# Patient Record
Sex: FEMALE | Race: WHITE | Employment: PART TIME | URBAN - NONMETROPOLITAN AREA
[De-identification: names, ages, dates, MRNs, and addresses within clinical notes are randomized per-mention and may not be internally consistent; named-entity substitution may affect disease eponyms.]

---

## 2022-03-22 ENCOUNTER — HOSPITAL ENCOUNTER (OUTPATIENT)
Age: 47
Setting detail: SPECIMEN
Discharge: HOME | End: 2022-03-22
Payer: COMMERCIAL

## 2022-03-22 PROCEDURE — 87338 HPYLORI STOOL AG IA: CPT

## 2022-03-23 LAB
DIRECT EXAM: NEGATIVE
SPECIMEN DESCRIPTION: NORMAL

## 2022-08-04 ENCOUNTER — APPOINTMENT (OUTPATIENT)
Dept: CT IMAGING | Age: 47
End: 2022-08-04
Payer: COMMERCIAL

## 2022-08-04 ENCOUNTER — HOSPITAL ENCOUNTER (OUTPATIENT)
Age: 47
Setting detail: OBSERVATION
Discharge: HOME | End: 2022-08-06
Attending: EMERGENCY MEDICINE | Admitting: STUDENT IN AN ORGANIZED HEALTH CARE EDUCATION/TRAINING PROGRAM
Payer: COMMERCIAL

## 2022-08-04 DIAGNOSIS — R52 INTRACTABLE PAIN: ICD-10-CM

## 2022-08-04 DIAGNOSIS — N20.0 KIDNEY STONE ON LEFT SIDE: Primary | ICD-10-CM

## 2022-08-04 DIAGNOSIS — N17.9 AKI (ACUTE KIDNEY INJURY) (HCC): ICD-10-CM

## 2022-08-04 LAB
ABSOLUTE EOS #: 0.17 K/UL (ref 0–0.44)
ABSOLUTE IMMATURE GRANULOCYTE: 0 K/UL (ref 0–0.3)
ABSOLUTE LYMPH #: 6.12 K/UL (ref 1.1–3.7)
ABSOLUTE MONO #: 1.19 K/UL (ref 0.1–1.2)
ANION GAP SERPL CALCULATED.3IONS-SCNC: 14 MMOL/L (ref 9–17)
BASOPHILS # BLD: 0 % (ref 0–2)
BASOPHILS ABSOLUTE: 0 K/UL (ref 0–0.2)
BUN BLDV-MCNC: 17 MG/DL (ref 6–20)
BUN/CREAT BLD: 18 (ref 9–20)
CALCIUM SERPL-MCNC: 9.5 MG/DL (ref 8.6–10.4)
CHLORIDE BLD-SCNC: 105 MMOL/L (ref 98–107)
CO2: 22 MMOL/L (ref 20–31)
CREAT SERPL-MCNC: 0.94 MG/DL (ref 0.5–0.9)
EOSINOPHILS RELATIVE PERCENT: 1 % (ref 1–4)
GFR AFRICAN AMERICAN: >60 ML/MIN
GFR NON-AFRICAN AMERICAN: >60 ML/MIN
GFR SERPL CREATININE-BSD FRML MDRD: ABNORMAL ML/MIN/{1.73_M2}
GFR SERPL CREATININE-BSD FRML MDRD: ABNORMAL ML/MIN/{1.73_M2}
GLUCOSE BLD-MCNC: 99 MG/DL (ref 70–99)
HCT VFR BLD CALC: 41.3 % (ref 36.3–47.1)
HEMOGLOBIN: 13.8 G/DL (ref 11.9–15.1)
IMMATURE GRANULOCYTES: 0 %
LYMPHOCYTES # BLD: 36 % (ref 24–43)
MCH RBC QN AUTO: 31.7 PG (ref 25.2–33.5)
MCHC RBC AUTO-ENTMCNC: 33.4 G/DL (ref 28.4–34.8)
MCV RBC AUTO: 94.9 FL (ref 82.6–102.9)
MONOCYTES # BLD: 7 % (ref 3–12)
MORPHOLOGY: NORMAL
NRBC AUTOMATED: 0 PER 100 WBC
PDW BLD-RTO: 12.7 % (ref 11.8–14.4)
PLATELET # BLD: 387 K/UL (ref 138–453)
PMV BLD AUTO: 9.8 FL (ref 8.1–13.5)
POTASSIUM SERPL-SCNC: 4 MMOL/L (ref 3.7–5.3)
RBC # BLD: 4.35 M/UL (ref 3.95–5.11)
SEG NEUTROPHILS: 56 % (ref 36–65)
SEGMENTED NEUTROPHILS ABSOLUTE COUNT: 9.52 K/UL (ref 1.5–8.1)
SODIUM BLD-SCNC: 141 MMOL/L (ref 135–144)
WBC # BLD: 17 K/UL (ref 3.5–11.3)

## 2022-08-04 PROCEDURE — 36415 COLL VENOUS BLD VENIPUNCTURE: CPT

## 2022-08-04 PROCEDURE — 2580000003 HC RX 258: Performed by: EMERGENCY MEDICINE

## 2022-08-04 PROCEDURE — 85025 COMPLETE CBC W/AUTO DIFF WBC: CPT

## 2022-08-04 PROCEDURE — 80048 BASIC METABOLIC PNL TOTAL CA: CPT

## 2022-08-04 PROCEDURE — 96367 TX/PROPH/DG ADDL SEQ IV INF: CPT

## 2022-08-04 PROCEDURE — 96365 THER/PROPH/DIAG IV INF INIT: CPT

## 2022-08-04 PROCEDURE — 81001 URINALYSIS AUTO W/SCOPE: CPT

## 2022-08-04 PROCEDURE — 6360000002 HC RX W HCPCS: Performed by: EMERGENCY MEDICINE

## 2022-08-04 PROCEDURE — 74176 CT ABD & PELVIS W/O CONTRAST: CPT

## 2022-08-04 PROCEDURE — 99285 EMERGENCY DEPT VISIT HI MDM: CPT

## 2022-08-04 PROCEDURE — 96376 TX/PRO/DX INJ SAME DRUG ADON: CPT

## 2022-08-04 PROCEDURE — 96361 HYDRATE IV INFUSION ADD-ON: CPT

## 2022-08-04 PROCEDURE — 96375 TX/PRO/DX INJ NEW DRUG ADDON: CPT

## 2022-08-04 RX ORDER — ATORVASTATIN CALCIUM 40 MG/1
40 TABLET, FILM COATED ORAL DAILY
COMMUNITY

## 2022-08-04 RX ORDER — LISINOPRIL 10 MG/1
10 TABLET ORAL DAILY
COMMUNITY

## 2022-08-04 RX ORDER — KETOROLAC TROMETHAMINE 30 MG/ML
30 INJECTION, SOLUTION INTRAMUSCULAR; INTRAVENOUS ONCE
Status: COMPLETED | OUTPATIENT
Start: 2022-08-04 | End: 2022-08-04

## 2022-08-04 RX ORDER — FENTANYL CITRATE 50 UG/ML
25 INJECTION, SOLUTION INTRAMUSCULAR; INTRAVENOUS ONCE
Status: COMPLETED | OUTPATIENT
Start: 2022-08-04 | End: 2022-08-04

## 2022-08-04 RX ORDER — ONDANSETRON 2 MG/ML
4 INJECTION INTRAMUSCULAR; INTRAVENOUS ONCE
Status: COMPLETED | OUTPATIENT
Start: 2022-08-04 | End: 2022-08-04

## 2022-08-04 RX ORDER — 0.9 % SODIUM CHLORIDE 0.9 %
1000 INTRAVENOUS SOLUTION INTRAVENOUS ONCE
Status: COMPLETED | OUTPATIENT
Start: 2022-08-04 | End: 2022-08-04

## 2022-08-04 RX ADMIN — HYDROMORPHONE HYDROCHLORIDE 1 MG: 1 INJECTION, SOLUTION INTRAMUSCULAR; INTRAVENOUS; SUBCUTANEOUS at 20:57

## 2022-08-04 RX ADMIN — SODIUM CHLORIDE 1000 ML: 9 INJECTION, SOLUTION INTRAVENOUS at 19:42

## 2022-08-04 RX ADMIN — CEFTRIAXONE 1000 MG: 1 INJECTION, POWDER, FOR SOLUTION INTRAMUSCULAR; INTRAVENOUS at 20:05

## 2022-08-04 RX ADMIN — KETOROLAC TROMETHAMINE 30 MG: 30 INJECTION, SOLUTION INTRAMUSCULAR at 19:22

## 2022-08-04 RX ADMIN — ONDANSETRON 4 MG: 2 INJECTION INTRAMUSCULAR; INTRAVENOUS at 19:21

## 2022-08-04 RX ADMIN — FENTANYL CITRATE 25 MCG: 50 INJECTION, SOLUTION INTRAMUSCULAR; INTRAVENOUS at 20:06

## 2022-08-04 RX ADMIN — HYDROMORPHONE HYDROCHLORIDE 1 MG: 1 INJECTION, SOLUTION INTRAMUSCULAR; INTRAVENOUS; SUBCUTANEOUS at 23:18

## 2022-08-04 ASSESSMENT — PAIN SCALES - GENERAL
PAINLEVEL_OUTOF10: 10
PAINLEVEL_OUTOF10: 8
PAINLEVEL_OUTOF10: 10
PAINLEVEL_OUTOF10: 7
PAINLEVEL_OUTOF10: 6
PAINLEVEL_OUTOF10: 7

## 2022-08-04 ASSESSMENT — PAIN DESCRIPTION - ORIENTATION
ORIENTATION: LEFT
ORIENTATION: LEFT
ORIENTATION: RIGHT

## 2022-08-04 ASSESSMENT — PAIN DESCRIPTION - PAIN TYPE: TYPE: ACUTE PAIN

## 2022-08-04 ASSESSMENT — PAIN DESCRIPTION - DESCRIPTORS
DESCRIPTORS: SHARP;STABBING
DESCRIPTORS: SHARP;STABBING

## 2022-08-04 ASSESSMENT — PAIN DESCRIPTION - LOCATION
LOCATION: FLANK
LOCATION: BACK
LOCATION: FLANK
LOCATION: BACK

## 2022-08-04 ASSESSMENT — ENCOUNTER SYMPTOMS
SORE THROAT: 0
BACK PAIN: 0
ABDOMINAL PAIN: 1
ABDOMINAL DISTENTION: 0
SHORTNESS OF BREATH: 0
NAUSEA: 1

## 2022-08-04 ASSESSMENT — PAIN DESCRIPTION - FREQUENCY: FREQUENCY: CONTINUOUS

## 2022-08-04 ASSESSMENT — PAIN - FUNCTIONAL ASSESSMENT: PAIN_FUNCTIONAL_ASSESSMENT: 0-10

## 2022-08-04 NOTE — ED PROVIDER NOTES
[facility] ED  EMERGENCY DEPARTMENT ENCOUNTER      Pt Name: Zelda Timmons  MRN: [id number]  [place] 1975  Date of evaluation: 8/4/2022  Provider: Lesly Ramey, [facility]       Chief Complaint   Patient presents with    Flank Pain     Left started approx 20 min ago, denies injury or difficulty urinating         HISTORY OF PRESENT ILLNESS   (Location/Symptom, Timing/Onset, Context/Setting, Quality, Duration, Modifying Factors, Severity)  Note limiting factors. Zelda Timmons is a 50 y.o. female who presents to the emergency department with complains of sudden onset left flank pain that started approximately 30 minutes prior to arrival.  Patient states that the pain radiates around to the left lower quadrant. She denies any difficulty with urinating or hematuria. She has some nausea but denies any vomiting. She states that she worked outside in the heat for a long time today and has not had much water to drink. She mostly drinks root beer and coffee. She has a history of kidney stones in the past and has been able to pass them on her own. She denies any fever or chills. Currently she rates her pain at a 10 out of 10 and appears to be quite uncomfortable on my exam.    REVIEW OF SYSTEMS    (2-9 systems for level 4, 10 or more for level 5)     Review of Systems   Constitutional:  Negative for fatigue and fever. HENT:  Negative for congestion and sore throat. Eyes:  Negative for visual disturbance. Respiratory:  Negative for shortness of breath. Cardiovascular:  Negative for chest pain and palpitations. Gastrointestinal:  Positive for abdominal pain and nausea. Negative for abdominal distention. Left flank pain   Genitourinary:  Negative for dysuria. Musculoskeletal:  Negative for back pain. Skin:  Negative for rash. Psychiatric/Behavioral:  Negative for suicidal ideas.       Except as noted above the remainder of the review of systems was reviewed and negative. PAST MEDICAL HISTORY     Past Medical History:   Diagnosis Date    Hyperlipidemia     Hypertension          SURGICAL HISTORY       Past Surgical History:   Procedure Laterality Date    BLADDER SURGERY           CURRENT MEDICATIONS       Previous Medications    ATORVASTATIN (LIPITOR) 40 MG TABLET    Take 40 mg by mouth in the morning. LISINOPRIL (PRINIVIL;ZESTRIL) 10 MG TABLET    Take 10 mg by mouth in the morning. ALLERGIES     Sulfa antibiotics    FAMILY HISTORY     History reviewed. No pertinent family history. SOCIAL HISTORY       Social History     Socioeconomic History    Marital status: Single     Spouse name: None    Number of children: None    Years of education: None    Highest education level: None       SCREENINGS                        PHYSICAL EXAM    (up to 7 for level 4, 8 or more for level 5)     ED Triage Vitals [08/04/22 1850]   BP Temp Temp src Heart Rate Resp SpO2 Height Weight   (!) 162/71 97.8 °F (36.6 °C) -- 77 20 95 % -- --       Physical Exam  Constitutional:       General: She is in acute distress. Appearance: Normal appearance. She is not toxic-appearing. HENT:      Head: Normocephalic and atraumatic. Mouth/Throat:      Mouth: Mucous membranes are moist.   Eyes:      Extraocular Movements: Extraocular movements intact. Pupils: Pupils are equal, round, and reactive to light. Cardiovascular:      Rate and Rhythm: Normal rate and regular rhythm. Pulses: Normal pulses. Heart sounds: Normal heart sounds. Pulmonary:      Effort: Pulmonary effort is normal.      Breath sounds: Normal breath sounds. Abdominal:      General: Abdomen is flat. Bowel sounds are normal.      Palpations: Abdomen is soft. Tenderness: abdominal tenderness in the left lower quadrant There is left CVA tenderness. Comments: Left flank pain   Musculoskeletal:         General: Normal range of motion. Skin:     General: Skin is warm and dry. Capillary Refill: Capillary refill takes less than 2 seconds. Neurological:      General: No focal deficit present. Mental Status: She is alert and oriented to person, place, and time. Psychiatric:         Mood and Affect: Mood normal.       DIAGNOSTIC RESULTS     RADIOLOGY:   Non-plain film images such as CT, Ultrasound and MRI are read by the radiologist. Plain radiographic images are visualized and preliminarily interpreted by the emergency physician with the below findings:      Interpretation per the Radiologist below, if available at the time of this note:    CT ABDOMEN PELVIS WO CONTRAST Additional Contrast? None   Final Result   Limited noncontrast examination. 7 mm hyperdense stone in the left proximal ureter resulting in moderate   upstream hydroureteronephrosis. Additional bilateral nonobstructing renal   stones. Increased sclerosis along the left sacroiliac joint, which may reflect   underlying sacroiliitis. LABS:  Labs Reviewed   BASIC METABOLIC PANEL - Abnormal; Notable for the following components:       Result Value    Creatinine 0.94 (*)     All other components within normal limits   CBC WITH AUTO DIFFERENTIAL - Abnormal; Notable for the following components:    WBC 17.0 (*)     Segs Absolute 9.52 (*)     Absolute Lymph # 6.12 (*)     All other components within normal limits   URINALYSIS WITH MICROSCOPIC - Abnormal; Notable for the following components:    Specific Gravity, UA >1.030 (*)     Urine Hgb 3+ (*)     All other components within normal limits       All other labs were within normal range or not returned as of this dictation.     EMERGENCY DEPARTMENT COURSE and DIFFERENTIAL DIAGNOSIS/MDM:   Vitals:    Vitals:    08/04/22 2230 08/04/22 2245 08/04/22 2300 08/04/22 2315   BP: 130/72 132/71 (!) 145/67 (!) 148/67   Pulse:       Resp:       Temp:       SpO2: 94% 92% 98% 94%       REASSESSMENT     ED Course as of 08/05/22 0055   Fri Aug 05, 2022   0046 Spoke with Dr. Tyrell Recinos, neurologist who will consult on the patient. Recommends keeping the patient n.p.o. We will consult hospitalist. [JI]   6441 With Dr. Rupal Suarez, hospitalist who will admit the patient to the hospital. [JI]      ED Course User Index  [JI] Shannan Patel DO     Patient continues to have pain after multiple doses of IV pain medications. Given her elevated white count and GENOVEVA and intractable pain I advised her to stay in the hospital.  Patient is agreeable to that. FINAL IMPRESSION      1. Kidney stone on left side    2. Intractable pain    3.  GENOVEVA (acute kidney injury) [facility])          DISPOSITION/PLAN   DISPOSITION Decision To Admit 08/05/2022 12:48:02 AM      (Please note that portions of this note were completed with a voice recognition program.  Efforts were made to edit the dictations but occasionally words are mis-transcribed.)    Shannan Patel DO (electronically signed)  Attending Emergency Physician            Shannan Patel DO  08/05/22 4498

## 2022-08-04 NOTE — ED NOTES
Pt had moderate emesis, states she feels less nauseated after vomiting     Gail Grossman RN  08/04/22 1974

## 2022-08-05 ENCOUNTER — ANESTHESIA (OUTPATIENT)
Dept: OPERATING ROOM | Age: 47
End: 2022-08-05
Payer: COMMERCIAL

## 2022-08-05 ENCOUNTER — APPOINTMENT (OUTPATIENT)
Dept: GENERAL RADIOLOGY | Age: 47
End: 2022-08-05
Payer: COMMERCIAL

## 2022-08-05 ENCOUNTER — ANESTHESIA EVENT (OUTPATIENT)
Dept: OPERATING ROOM | Age: 47
End: 2022-08-05
Payer: COMMERCIAL

## 2022-08-05 PROBLEM — N13.30 HYDRONEPHROSIS: Status: ACTIVE | Noted: 2022-08-05

## 2022-08-05 PROBLEM — N20.1 URETERAL CALCULUS: Status: ACTIVE | Noted: 2022-08-05

## 2022-08-05 LAB
BILIRUBIN URINE: NEGATIVE
COLOR: YELLOW
EPITHELIAL CELLS UA: ABNORMAL /HPF (ref 0–25)
GLUCOSE URINE: NEGATIVE
HCG(URINE) PREGNANCY TEST: NEGATIVE
KETONES, URINE: NEGATIVE
LEUKOCYTE ESTERASE, URINE: NEGATIVE
NITRITE, URINE: NEGATIVE
PH UA: 6 (ref 5–9)
PROTEIN UA: NEGATIVE
RBC UA: ABNORMAL /HPF (ref 0–2)
SPECIFIC GRAVITY UA: >1.03 (ref 1.01–1.02)
TURBIDITY: CLEAR
URINE HGB: ABNORMAL
UROBILINOGEN, URINE: NORMAL
WBC UA: ABNORMAL /HPF (ref 0–5)

## 2022-08-05 PROCEDURE — 3209999900 FLUORO FOR SURGICAL PROCEDURES

## 2022-08-05 PROCEDURE — 2500000003 HC RX 250 WO HCPCS: Performed by: NURSE ANESTHETIST, CERTIFIED REGISTERED

## 2022-08-05 PROCEDURE — 2580000003 HC RX 258: Performed by: NURSE ANESTHETIST, CERTIFIED REGISTERED

## 2022-08-05 PROCEDURE — G0378 HOSPITAL OBSERVATION PER HR: HCPCS

## 2022-08-05 PROCEDURE — 3600000003 HC SURGERY LEVEL 3 BASE: Performed by: UROLOGY

## 2022-08-05 PROCEDURE — C2617 STENT, NON-COR, TEM W/O DEL: HCPCS | Performed by: UROLOGY

## 2022-08-05 PROCEDURE — 7100000011 HC PHASE II RECOVERY - ADDTL 15 MIN: Performed by: UROLOGY

## 2022-08-05 PROCEDURE — 6370000000 HC RX 637 (ALT 250 FOR IP): Performed by: UROLOGY

## 2022-08-05 PROCEDURE — 81025 URINE PREGNANCY TEST: CPT

## 2022-08-05 PROCEDURE — 2709999900 HC NON-CHARGEABLE SUPPLY: Performed by: UROLOGY

## 2022-08-05 PROCEDURE — 7100000010 HC PHASE II RECOVERY - FIRST 15 MIN: Performed by: UROLOGY

## 2022-08-05 PROCEDURE — 6360000002 HC RX W HCPCS: Performed by: NURSE ANESTHETIST, CERTIFIED REGISTERED

## 2022-08-05 PROCEDURE — 6360000002 HC RX W HCPCS: Performed by: EMERGENCY MEDICINE

## 2022-08-05 PROCEDURE — 3700000001 HC ADD 15 MINUTES (ANESTHESIA): Performed by: UROLOGY

## 2022-08-05 PROCEDURE — C1769 GUIDE WIRE: HCPCS | Performed by: UROLOGY

## 2022-08-05 PROCEDURE — 96376 TX/PRO/DX INJ SAME DRUG ADON: CPT

## 2022-08-05 PROCEDURE — 2580000003 HC RX 258: Performed by: UROLOGY

## 2022-08-05 PROCEDURE — 6360000002 HC RX W HCPCS: Performed by: UROLOGY

## 2022-08-05 PROCEDURE — 3700000000 HC ANESTHESIA ATTENDED CARE: Performed by: UROLOGY

## 2022-08-05 PROCEDURE — 96375 TX/PRO/DX INJ NEW DRUG ADDON: CPT

## 2022-08-05 PROCEDURE — 3600000013 HC SURGERY LEVEL 3 ADDTL 15MIN: Performed by: UROLOGY

## 2022-08-05 DEVICE — URETERAL STENT
Type: IMPLANTABLE DEVICE | Site: URETER | Status: FUNCTIONAL
Brand: PERCUFLEX™ PLUS

## 2022-08-05 RX ORDER — ATORVASTATIN CALCIUM 40 MG/1
40 TABLET, FILM COATED ORAL NIGHTLY
Status: DISCONTINUED | OUTPATIENT
Start: 2022-08-05 | End: 2022-08-06 | Stop reason: HOSPADM

## 2022-08-05 RX ORDER — OXYCODONE HYDROCHLORIDE 5 MG/1
5 TABLET ORAL
Status: DISCONTINUED | OUTPATIENT
Start: 2022-08-05 | End: 2022-08-05 | Stop reason: HOSPADM

## 2022-08-05 RX ORDER — SODIUM CHLORIDE 0.9 % (FLUSH) 0.9 %
5-40 SYRINGE (ML) INJECTION PRN
Status: DISCONTINUED | OUTPATIENT
Start: 2022-08-05 | End: 2022-08-06 | Stop reason: HOSPADM

## 2022-08-05 RX ORDER — METOCLOPRAMIDE HYDROCHLORIDE 5 MG/ML
10 INJECTION INTRAMUSCULAR; INTRAVENOUS
Status: DISCONTINUED | OUTPATIENT
Start: 2022-08-05 | End: 2022-08-05 | Stop reason: HOSPADM

## 2022-08-05 RX ORDER — SODIUM CHLORIDE 0.9 % (FLUSH) 0.9 %
5-40 SYRINGE (ML) INJECTION PRN
Status: DISCONTINUED | OUTPATIENT
Start: 2022-08-05 | End: 2022-08-05 | Stop reason: HOSPADM

## 2022-08-05 RX ORDER — KETOROLAC TROMETHAMINE 30 MG/ML
INJECTION, SOLUTION INTRAMUSCULAR; INTRAVENOUS PRN
Status: DISCONTINUED | OUTPATIENT
Start: 2022-08-05 | End: 2022-08-05 | Stop reason: SDUPTHER

## 2022-08-05 RX ORDER — ONDANSETRON 2 MG/ML
4 INJECTION INTRAMUSCULAR; INTRAVENOUS
Status: DISCONTINUED | OUTPATIENT
Start: 2022-08-05 | End: 2022-08-05 | Stop reason: HOSPADM

## 2022-08-05 RX ORDER — KETOROLAC TROMETHAMINE 30 MG/ML
30 INJECTION, SOLUTION INTRAMUSCULAR; INTRAVENOUS
Status: ACTIVE | OUTPATIENT
Start: 2022-08-05 | End: 2022-08-05

## 2022-08-05 RX ORDER — SODIUM CHLORIDE 0.9 % (FLUSH) 0.9 %
5-40 SYRINGE (ML) INJECTION EVERY 12 HOURS SCHEDULED
Status: DISCONTINUED | OUTPATIENT
Start: 2022-08-05 | End: 2022-08-05 | Stop reason: HOSPADM

## 2022-08-05 RX ORDER — LIDOCAINE HYDROCHLORIDE 20 MG/ML
JELLY TOPICAL PRN
Status: DISCONTINUED | OUTPATIENT
Start: 2022-08-05 | End: 2022-08-05 | Stop reason: ALTCHOICE

## 2022-08-05 RX ORDER — MORPHINE SULFATE 4 MG/ML
4 INJECTION, SOLUTION INTRAMUSCULAR; INTRAVENOUS ONCE
Status: COMPLETED | OUTPATIENT
Start: 2022-08-05 | End: 2022-08-05

## 2022-08-05 RX ORDER — SODIUM CHLORIDE, SODIUM LACTATE, POTASSIUM CHLORIDE, CALCIUM CHLORIDE 600; 310; 30; 20 MG/100ML; MG/100ML; MG/100ML; MG/100ML
INJECTION, SOLUTION INTRAVENOUS CONTINUOUS PRN
Status: DISCONTINUED | OUTPATIENT
Start: 2022-08-05 | End: 2022-08-05 | Stop reason: SDUPTHER

## 2022-08-05 RX ORDER — FENTANYL CITRATE 50 UG/ML
50 INJECTION, SOLUTION INTRAMUSCULAR; INTRAVENOUS EVERY 5 MIN PRN
Status: DISCONTINUED | OUTPATIENT
Start: 2022-08-05 | End: 2022-08-05 | Stop reason: HOSPADM

## 2022-08-05 RX ORDER — ONDANSETRON 2 MG/ML
4 INJECTION INTRAMUSCULAR; INTRAVENOUS EVERY 6 HOURS PRN
Status: DISCONTINUED | OUTPATIENT
Start: 2022-08-05 | End: 2022-08-06 | Stop reason: HOSPADM

## 2022-08-05 RX ORDER — PROPOFOL 10 MG/ML
INJECTION, EMULSION INTRAVENOUS PRN
Status: DISCONTINUED | OUTPATIENT
Start: 2022-08-05 | End: 2022-08-05 | Stop reason: SDUPTHER

## 2022-08-05 RX ORDER — SODIUM CHLORIDE 9 MG/ML
INJECTION, SOLUTION INTRAVENOUS PRN
Status: DISCONTINUED | OUTPATIENT
Start: 2022-08-05 | End: 2022-08-06 | Stop reason: HOSPADM

## 2022-08-05 RX ORDER — CIPROFLOXACIN 2 MG/ML
400 INJECTION, SOLUTION INTRAVENOUS ONCE
Status: COMPLETED | OUTPATIENT
Start: 2022-08-05 | End: 2022-08-05

## 2022-08-05 RX ORDER — DEXAMETHASONE SODIUM PHOSPHATE 4 MG/ML
INJECTION, SOLUTION INTRA-ARTICULAR; INTRALESIONAL; INTRAMUSCULAR; INTRAVENOUS; SOFT TISSUE PRN
Status: DISCONTINUED | OUTPATIENT
Start: 2022-08-05 | End: 2022-08-05 | Stop reason: SDUPTHER

## 2022-08-05 RX ORDER — OXYCODONE HYDROCHLORIDE 5 MG/1
5 TABLET ORAL PRN
Status: ACTIVE | OUTPATIENT
Start: 2022-08-05 | End: 2022-08-05

## 2022-08-05 RX ORDER — LISINOPRIL 10 MG/1
10 TABLET ORAL DAILY
Status: DISCONTINUED | OUTPATIENT
Start: 2022-08-05 | End: 2022-08-06 | Stop reason: HOSPADM

## 2022-08-05 RX ORDER — OXYCODONE HYDROCHLORIDE 5 MG/1
10 TABLET ORAL PRN
Status: ACTIVE | OUTPATIENT
Start: 2022-08-05 | End: 2022-08-05

## 2022-08-05 RX ORDER — LIDOCAINE HYDROCHLORIDE 20 MG/ML
INJECTION, SOLUTION EPIDURAL; INFILTRATION; INTRACAUDAL; PERINEURAL PRN
Status: DISCONTINUED | OUTPATIENT
Start: 2022-08-05 | End: 2022-08-05 | Stop reason: SDUPTHER

## 2022-08-05 RX ORDER — KETOROLAC TROMETHAMINE 30 MG/ML
30 INJECTION, SOLUTION INTRAMUSCULAR; INTRAVENOUS ONCE
Status: COMPLETED | OUTPATIENT
Start: 2022-08-05 | End: 2022-08-05

## 2022-08-05 RX ORDER — ONDANSETRON 4 MG/1
4 TABLET, ORALLY DISINTEGRATING ORAL EVERY 8 HOURS PRN
Status: DISCONTINUED | OUTPATIENT
Start: 2022-08-05 | End: 2022-08-06 | Stop reason: HOSPADM

## 2022-08-05 RX ORDER — SODIUM CHLORIDE 9 MG/ML
INJECTION, SOLUTION INTRAVENOUS PRN
Status: DISCONTINUED | OUTPATIENT
Start: 2022-08-05 | End: 2022-08-05 | Stop reason: HOSPADM

## 2022-08-05 RX ORDER — POLYETHYLENE GLYCOL 3350 17 G/17G
17 POWDER, FOR SOLUTION ORAL DAILY PRN
Status: DISCONTINUED | OUTPATIENT
Start: 2022-08-05 | End: 2022-08-06 | Stop reason: HOSPADM

## 2022-08-05 RX ORDER — SODIUM CHLORIDE 9 MG/ML
INJECTION, SOLUTION INTRAVENOUS CONTINUOUS
Status: DISCONTINUED | OUTPATIENT
Start: 2022-08-05 | End: 2022-08-06 | Stop reason: HOSPADM

## 2022-08-05 RX ORDER — SODIUM CHLORIDE 0.9 % (FLUSH) 0.9 %
5-40 SYRINGE (ML) INJECTION EVERY 12 HOURS SCHEDULED
Status: DISCONTINUED | OUTPATIENT
Start: 2022-08-05 | End: 2022-08-06 | Stop reason: HOSPADM

## 2022-08-05 RX ORDER — ONDANSETRON 2 MG/ML
INJECTION INTRAMUSCULAR; INTRAVENOUS PRN
Status: DISCONTINUED | OUTPATIENT
Start: 2022-08-05 | End: 2022-08-05 | Stop reason: SDUPTHER

## 2022-08-05 RX ADMIN — LIDOCAINE HYDROCHLORIDE 100 MG: 20 INJECTION, SOLUTION EPIDURAL; INFILTRATION; INTRACAUDAL; PERINEURAL at 08:58

## 2022-08-05 RX ADMIN — KETOROLAC TROMETHAMINE 30 MG: 30 INJECTION, SOLUTION INTRAMUSCULAR at 09:05

## 2022-08-05 RX ADMIN — LISINOPRIL 10 MG: 10 TABLET ORAL at 13:47

## 2022-08-05 RX ADMIN — CEFTRIAXONE 1000 MG: 1 INJECTION, POWDER, FOR SOLUTION INTRAMUSCULAR; INTRAVENOUS at 20:26

## 2022-08-05 RX ADMIN — SODIUM CHLORIDE: 9 INJECTION, SOLUTION INTRAVENOUS at 12:50

## 2022-08-05 RX ADMIN — SODIUM CHLORIDE, POTASSIUM CHLORIDE, SODIUM LACTATE AND CALCIUM CHLORIDE: 600; 310; 30; 20 INJECTION, SOLUTION INTRAVENOUS at 08:53

## 2022-08-05 RX ADMIN — POLYETHYLENE GLYCOL 3350 17 G: 17 POWDER, FOR SOLUTION ORAL at 18:56

## 2022-08-05 RX ADMIN — CIPROFLOXACIN 400 MG: 2 INJECTION, SOLUTION INTRAVENOUS at 08:51

## 2022-08-05 RX ADMIN — MORPHINE SULFATE 4 MG: 4 INJECTION, SOLUTION INTRAMUSCULAR; INTRAVENOUS at 05:07

## 2022-08-05 RX ADMIN — DEXAMETHASONE SODIUM PHOSPHATE 4 MG: 4 INJECTION, SOLUTION INTRAMUSCULAR; INTRAVENOUS at 08:58

## 2022-08-05 RX ADMIN — PROPOFOL 200 MG: 10 INJECTION, EMULSION INTRAVENOUS at 08:58

## 2022-08-05 RX ADMIN — SODIUM CHLORIDE: 9 INJECTION, SOLUTION INTRAVENOUS at 20:23

## 2022-08-05 RX ADMIN — ONDANSETRON 4 MG: 2 INJECTION, SOLUTION INTRAMUSCULAR; INTRAVENOUS at 09:05

## 2022-08-05 RX ADMIN — ATORVASTATIN CALCIUM 40 MG: 40 TABLET, FILM COATED ORAL at 20:29

## 2022-08-05 RX ADMIN — KETOROLAC TROMETHAMINE 30 MG: 30 INJECTION, SOLUTION INTRAMUSCULAR at 03:03

## 2022-08-05 ASSESSMENT — PAIN SCALES - GENERAL
PAINLEVEL_OUTOF10: 0
PAINLEVEL_OUTOF10: 0
PAINLEVEL_OUTOF10: 10
PAINLEVEL_OUTOF10: 7

## 2022-08-05 ASSESSMENT — LIFESTYLE VARIABLES: SMOKING_STATUS: 1

## 2022-08-05 ASSESSMENT — PAIN - FUNCTIONAL ASSESSMENT: PAIN_FUNCTIONAL_ASSESSMENT: NONE - DENIES PAIN

## 2022-08-05 NOTE — ED NOTES
Left a message for Dr. Lazaro Comes per Dr. Anne Marie More request.      Oleksandr Downing  08/05/22 [id number]

## 2022-08-05 NOTE — ANESTHESIA PRE PROCEDURE
Department of Anesthesiology  Preprocedure Note       Name:  Garry Walker   Age:  50 y.o.  :  1975                                          MRN:  [id number]         Date:  2022      Surgeon: Jessie Mohs):  Nico Desouza MD    Procedure: Procedure(s):  CYSTOSCOPY URETERAL STENT INSERTION    Medications prior to admission:   Prior to Admission medications    Medication Sig Start Date End Date Taking? Authorizing Provider   lisinopril (PRINIVIL;ZESTRIL) 10 MG tablet Take 10 mg by mouth in the morning. Yes Historical Provider, MD   atorvastatin (LIPITOR) 40 MG tablet Take 40 mg by mouth in the morning. Yes Historical Provider, MD       Current medications:    Current Facility-Administered Medications   Medication Dose Route Frequency Provider Last Rate Last Admin    ciprofloxacin (CIPRO) IVPB 400 mg  400 mg IntraVENous Once Nico Desouza MD           Allergies:     Allergies   Allergen Reactions    Sulfa Antibiotics        Problem List:    Patient Active Problem List   Diagnosis Code    Ureteral calculus N20.1       Past Medical History:        Diagnosis Date    Hyperlipidemia     Hypertension        Past Surgical History:        Procedure Laterality Date    BLADDER SURGERY         Social History:    Social History     Tobacco Use    Smoking status: Not on file    Smokeless tobacco: Not on file   Substance Use Topics    Alcohol use: Not on file                                Counseling given: Not Answered      Vital Signs (Current):   Vitals:    22 0145 22 0200 22 0216 22 0724   BP: (!) 140/64 (!) 141/79 (!) 128/57 (!) 145/73   Pulse:    66   Resp:    16   Temp:    36.2 °C (97.1 °F)   TempSrc:    Temporal   SpO2: 92% 92% 97% 96%   Weight:    220 lb (99.8 kg)   Height:    5' 7\" (1.702 m)                                              BP Readings from Last 3 Encounters:   22 (!) 145/73       NPO Status: Time of last liquid consumption: 1300                        Time of last solid consumption: 1300                        Date of last liquid consumption: 08/04/22                        Date of last solid food consumption: 08/04/22    BMI:   Wt Readings from Last 3 Encounters:   08/05/22 220 lb (99.8 kg)     Body mass index is 34.46 kg/m². CBC:   Lab Results   Component Value Date/Time    WBC 17.0 08/04/2022 06:59 PM    RBC 4.35 08/04/2022 06:59 PM    HGB 13.8 08/04/2022 06:59 PM    HCT 41.3 08/04/2022 06:59 PM    MCV 94.9 08/04/2022 06:59 PM    RDW 12.7 08/04/2022 06:59 PM     08/04/2022 06:59 PM       CMP:   Lab Results   Component Value Date/Time     08/04/2022 06:59 PM    K 4.0 08/04/2022 06:59 PM     08/04/2022 06:59 PM    CO2 22 08/04/2022 06:59 PM    BUN 17 08/04/2022 06:59 PM    CREATININE 0.94 08/04/2022 06:59 PM    GFRAA >60 08/04/2022 06:59 PM    LABGLOM >60 08/04/2022 06:59 PM    GLUCOSE 99 08/04/2022 06:59 PM    CALCIUM 9.5 08/04/2022 06:59 PM       POC Tests: No results for input(s): POCGLU, POCNA, POCK, POCCL, POCBUN, POCHEMO, POCHCT in the last 72 hours. Coags: No results found for: PROTIME, INR, APTT    HCG (If Applicable):   Lab Results   Component Value Date    PREGTESTUR NEGATIVE 08/05/2022        ABGs: No results found for: PHART, PO2ART, AXF2LZO, QXN4REE, BEART, B7UKBNTD     Type & Screen (If Applicable):  No results found for: LABABO, LABRH    Drug/Infectious Status (If Applicable):  No results found for: HIV, HEPCAB    COVID-19 Screening (If Applicable): No results found for: COVID19        Anesthesia Evaluation  Patient summary reviewed and Nursing notes reviewed no history of anesthetic complications:   Airway: Mallampati: I  TM distance: >3 FB   Neck ROM: full  Mouth opening: > = 3 FB   Dental: normal exam         Pulmonary:Negative Pulmonary ROS and normal exam  breath sounds clear to auscultation  (+) current smoker          Patient did not smoke on day of surgery.                  Cardiovascular:  Exercise tolerance: good (>4

## 2022-08-05 NOTE — ED NOTES
Contacted Dr. Andrews Pena via answering service per Dr. Rula Marin request.      Fernie Meneses  08/05/22 3828

## 2022-08-05 NOTE — PROGRESS NOTES
Annelise from Lab called and stated her urine pregnancy test was negative. Urine used from recent UA.

## 2022-08-05 NOTE — BRIEF OP NOTE
Brief Postoperative Note      Patient: Vidhya Conway  YOB: 1975  MRN: [id number]    Date of Procedure: 8/5/2022    Pre-Op Diagnosis: Calculus of kidney [N20.0]    Post-Op Diagnosis: Same       Procedure(s):  CYSTOSCOPY URETERAL STENT INSERTION    Surgeon(s):  Alycia Lanes, MD    Assistant:  * No surgical staff found *    Anesthesia: General    Estimated Blood Loss (mL): Minimal    Complications: None    Specimens:   * No specimens in log *    Implants:  Implant Name Type Inv.  Item Serial No.  Lot No. LRB No. Used Action   STENT URET 6FR L26CM HYDR+ PGTL [facility] - [id number]  [facility] 6FR L26CM HYDR+ PGTL TAPR TIP GRAD BLDR MRK LO  [facility] UROLOGY- [id number] Left 1 Implanted         Drains: * No LDAs found *    Findings: left ureteral obstruction    Electronically signed by Alycia Lanes, MD on 8/5/2022 at 9:47 AM

## 2022-08-05 NOTE — PROGRESS NOTES
Nutrition Assessment     Type and Reason for Visit: Initial    Nutrition Recommendations/Plan:   Continue current diet. Malnutrition Assessment:  Malnutrition Status: At risk for malnutrition (Comment)    Nutrition Assessment:  Inadequate oral intakes r/t altered taste perception aeb eating only once daily, nothing tastes right since having COVID-19 in November 2021. Pt drank a lot of root beer, states when she had her last kidney stone it was from drinking dark pop. Plans to drink more water. Discussed additional kidney stone nutrition strategies with Pt. Encouraged water, eat 3 meals per day, increased fruits/vegetables, and include calcium containing foods in her diet. Encouraged adding seasonings to food to aid taste acuity. States pepper helps. Nutrition Related Findings:   appears well nourished Wound Type: None    Current Nutrition Therapies:    ADULT DIET;  Regular    Anthropometric Measures:  Height: 5' 7\" (170.2 cm)  Current Body Wt: 220 lb (99.8 kg)   BMI: 34.4    Nutrition Diagnosis:   Inadequate energy intake related to altered taste perception as evidenced by poor intake prior to admission    Nutrition Interventions:   Food and/or Nutrient Delivery: Continue Current Diet  Nutrition Education/Counseling: Education completed  Coordination of Nutrition Care: Continue to monitor while inpatient  Plan of Care discussed with: Patient    Goals:     Goals: PO intake 75% or greater       Nutrition Monitoring and Evaluation:   Behavioral-Environmental Outcomes: None Identified  Food/Nutrient Intake Outcomes: Food and Nutrient Intake  Physical Signs/Symptoms Outcomes: Biochemical Data, Weight    Discharge Planning:    Continue current diet     Terrell Martins, [address],   Contact: [phone]

## 2022-08-05 NOTE — ED NOTES
Spoke with Dr. Vannesa Stanley, he plans to take patient to surgery for stenting after his 0730 case. Lester Palma RN, [facility].         Omid Morel RN  08/05/22 7451

## 2022-08-05 NOTE — PROGRESS NOTES
Pt. Arrived to unit alert and oriented x 4. Pt. Ambulates to bathroom with steady gait. Pt. Voided at this time. Pt. Vitals are assessed, Vitals are WDL.

## 2022-08-05 NOTE — PROGRESS NOTES

## 2022-08-05 NOTE — ANESTHESIA POSTPROCEDURE EVALUATION
Department of Anesthesiology  Postprocedure Note    Patient: Javed Mitchell  MRN: [id number]  YOB: 1975  Date of evaluation: 8/5/2022      Procedure Summary     Date: 08/05/22 Room / Location: [facility]    Anesthesia Start: 4046 Anesthesia Stop: 5999    Procedure: CYSTOSCOPY URETERAL [address] [facility] (Left) Diagnosis:       Calculus of kidney      (Calculus of kidney [N20.0])    Surgeons: Awais Johnston MD Responsible Provider: TIM Jean CRNA    Anesthesia Type: general ASA Status: 3          Anesthesia Type: No value filed.     Shannon Phase I:      Shannon Phase II: Shannon Score: 10      Anesthesia Post Evaluation    Patient location during evaluation: PACU  Patient participation: complete - patient participated  Level of consciousness: awake and alert  Airway patency: patent  Nausea & Vomiting: no vomiting and no nausea  Complications: no  Cardiovascular status: blood pressure returned to baseline  Hydration status: stable

## 2022-08-05 NOTE — PROGRESS NOTES
Nutrition Education    Educated on Kidney stone nutrition therapy  Learners: Patient  Readiness: Acceptance  Method: Explanation and Handout  Response: Verbalizes Understanding  Contact name and number provided.     Ana Churchill RD, LD  Contact Number: [phone]

## 2022-08-05 NOTE — H&P
History and Physical    Patient:  Kareen Ceja  MRN: [id number]    Chief Complaint:  left flank pain    History Obtained From:  patient, electronic medical record    PCP: Nicole Haynes    History of Present Illness: The patient is a 50 y.o. female who presented to the emergency room with complaints of sudden onset of left flank pain. She stated her symptoms began approximately 30 minutes prior to arrival.  She stated the pain radiated around to her left lower quadrant of her abdomen. She denied any difficulty with urination or hematuria. She did complain of nausea but no vomiting. She was afebrile. She stated that she drinks mostly root beer and coffee. She does have a history of kidney stones in the past but was able to pass them on her own. Pain was rated 10 out of 10 upon arrival.  CT scan showed a 7 mm hyperdense stone in the left proximal ureter resulting in moderate upstream hydroureteronephrosis. Additionally she had bilateral nonobstructing renal stones as well. Patient was taken to surgery prior to admission to the floor and underwent cystoscopy with stent placement. Currently the patient is resting comfortably however does has visible hematuria at this time. She denies any further pain nausea or vomiting. Past Medical History:        Diagnosis Date    Hyperlipidemia     Hypertension        Past Surgical History:        Procedure Laterality Date    BLADDER SURGERY      CYSTOSCOPY Left 08/05/2022    Dr. Robyn Prieto - with stent placement    CYSTOSCOPY W/ URETERAL STENT PLACEMENT Left 08/05/2022    with Dr. Robyn Prieto       Medications Prior to Admission:    Prior to Admission medications    Medication Sig Start Date End Date Taking? Authorizing Provider   lisinopril (PRINIVIL;ZESTRIL) 10 MG tablet Take 10 mg by mouth in the morning. Yes Historical Provider, MD   atorvastatin (LIPITOR) 40 MG tablet Take 40 mg by mouth in the morning.    Yes Historical Provider, MD       Allergies:  Sulfa antibiotics    Social History:   TOBACCO:   reports that she has been smoking cigarettes. She has been smoking an average of .5 packs per day. She has never used smokeless tobacco.  ETOH:   has no history on file for alcohol use. Family History:   History reviewed. No pertinent family history. Allergies:  Sulfa antibiotics    Medications Prior to Admission:    Prior to Admission medications    Medication Sig Start Date End Date Taking? Authorizing Provider   lisinopril (PRINIVIL;ZESTRIL) 10 MG tablet Take 10 mg by mouth in the morning. Yes Historical Provider, MD   atorvastatin (LIPITOR) 40 MG tablet Take 40 mg by mouth in the morning. Yes Historical Provider, MD       Review of Systems:  Constitutional:negative  for fevers, and negative for chills. Eyes: negative for visual disturbance   ENT: negative for sore throat, negative nasal congestion, and negative for earache  Respiratory: negative for shortness of breath, negative for cough, and negative for wheezing  Cardiovascular: negative for chest pain, negative for palpitations, and negative for syncope  Gastrointestinal: positive for abdominal pain, positive for nausea,negative for vomiting, negative for diarrhea, negative for constipation, and negative for hematochezia or melena  Genitourinary: negative for dysuria, negative for urinary urgency, negative for urinary frequency, and negative for hematuria  Skin: negative for skin rash, and negative for skin lesions  Neurological: negative for unilateral weakness, numbness or tingling. Physical Exam:    Vitals:   Temp: (!) 96.5 °F (35.8 °C)  BP: (!) 142/77  Resp: 18  Heart Rate: 70  SpO2: 96 %  24HR INTAKE/OUTPUT:    Intake/Output Summary (Last 24 hours) at 8/5/2022 1546  Last data filed at 8/5/2022 1000  Gross per 24 hour   Intake 100 ml   Output --   Net 100 ml       Weight    Body mass index is 34.46 kg/m². Exam:  GEN:    Awake, alert and oriented x3. EYES:  EOMI, pupils equal   NECK: Supple. No lymphadenopathy. No carotid bruit  CVS:    regular rate and rhythm, no audible murmur  PULM:  CTA, no wheezes, rales or rhonchi, no acute respiratory distress  ABD:    Bowels sounds normal.  Abdomen is soft. No distention. no tenderness to palpation. EXT:   no edema bilaterally . No calf tenderness. NEURO: Moves all extremities. Motor and sensory are grossly intact  SKIN:  No rashes. No skin lesions.    -----------------------------------------------------------------  Diagnostic Data:     DATA:    CBC:   Lab Results   Component Value Date    WBC 17.0 (H) 08/04/2022    RBC 4.35 08/04/2022    HGB 13.8 08/04/2022    HCT 41.3 08/04/2022    MCV 94.9 08/04/2022     08/04/2022        CMP:   Lab Results   Component Value Date    GLUCOSE 99 08/04/2022    BUN 17 08/04/2022    CREATININE 0.94 (H) 08/04/2022     08/04/2022    K 4.0 08/04/2022    CALCIUM 9.5 08/04/2022     08/04/2022    CO2 22 08/04/2022       UA:   Lab Results   Component Value Date    COLORU Yellow 08/04/2022    SPECGRAV >1.030 (H) 08/04/2022    WBCUA 0 TO 2 08/04/2022    RBCUA 2 TO 5 08/04/2022    EPITHUA 2 TO 5 08/04/2022    LEUKOCYTESUR NEGATIVE 08/04/2022    GLUCOSEU NEGATIVE 08/04/2022    KETUA NEGATIVE 08/04/2022    PROTEINU NEGATIVE 08/04/2022    HGBUR 3+ (A) 08/04/2022       Lactic Acid:   No results found for: LACTA    D-Dimer:  No results found for: DDIMER    PT/INR:  No results found for: PROTIME, INR    High Sensitivity Troponin:  No results for input(s): TROPHS in the last 72 hours. ABGs:   No results found for: PHART, PH, VWI4JEO, PCO2, PO2ART, PO2, DCE0GMG, HCO3, BEART, BE, THGBART, THB, SCA9LJM, R4NLYXFD, O2SAT, FIO2        FLUORO FOR SURGICAL PROCEDURES   Final Result      CT ABDOMEN PELVIS WO CONTRAST Additional Contrast? None   Final Result   Limited noncontrast examination. 7 mm hyperdense stone in the left proximal ureter resulting in moderate   upstream hydroureteronephrosis.   Additional bilateral nonobstructing renal   stones. Increased sclerosis along the left sacroiliac joint, which may reflect   underlying sacroiliitis. EKG reviewed-Not ordered    Assessment:    Principal Problem:    Ureteral calculus  Active Problems:    Hydronephrosis  Resolved Problems:    * No resolved hospital problems. *      Patient Active Problem List    Diagnosis Date Noted    Ureteral calculus 08/05/2022    Hydronephrosis 08/05/2022       Plan: This patient requires overnight observation because of left ureteral calculi with hydronephrosis  Factors affecting the medical complexity of this patient include none  Estimated length of stay is 2 days  Left ureteral calculi with hydronephrosis  Appreciate urology  Cystoscopy with stent placement completed today  IV fluids  IV Rocephin  Trend labs  DVT prophylaxis: Lovenox  Peptic ulcer prophylaxis: Pepcid  High risk medications: none  Social Service and Case Management consults for DC planning  Dietician consult initiated    CORE MEASURES  DVT prophylaxis: Lovenox  Decubitus ulcer present on admission: No  CODE STATUS: FULL CODE  Nutrition Status: good   Physical therapy: No   Old Charts reviewed: Yes  EKG Reviewed: No  Advance Directive Addressed:  \"Yes - in chart    TIM Lamar - CNP, TIM, NP-C  8/5/2022, 3:46 PM

## 2022-08-05 NOTE — CONSULTS
on file   Food Insecurity: Not on file   Transportation Needs: Not on file   Physical Activity: Not on file   Stress: Not on file   Social Connections: Not on file   Intimate Partner Violence: Not on file   Housing Stability: Not on file       Family History:  History reviewed. No pertinent family history. Previous Urologic Family history: none  REVIEW OF SYSTEMS:  Constitutional: negative  Eyes: negative  Respiratory: negative  Cardiovascular: negative  Gastrointestinal: negative  Genitourinary: see HPI  Musculoskeletal: negative  Skin: negative   Neurological: negative  Hematological/Lymphatic: negative  Psychological: negative    Physical Exam:      This a 50 y.o. female   Patient Vitals for the past 24 hrs:   BP Temp Temp src Pulse Resp SpO2 Height Weight   08/05/22 0724 (!) 145/73 97.1 °F (36.2 °C) Temporal 66 16 96 % 5' 7\" (1.702 m) 220 lb (99.8 kg)   08/05/22 0216 (!) 128/57 -- -- -- -- 97 % -- --   08/05/22 0200 (!) 141/79 -- -- -- -- 92 % -- --   08/05/22 0145 (!) 140/64 -- -- -- -- 92 % -- --   08/05/22 0130 (!) 149/78 -- -- -- -- 93 % -- --   08/05/22 0115 138/78 -- -- -- -- 91 % -- --   08/05/22 0100 (!) 161/98 -- -- -- -- 96 % -- --   08/05/22 0045 (!) 153/84 -- -- -- -- 94 % -- --   08/05/22 0030 (!) 154/70 -- -- -- -- 96 % -- --   08/05/22 0015 (!) 147/86 -- -- -- -- 95 % -- --   08/05/22 0000 (!) 158/80 -- -- -- -- 92 % -- --   08/04/22 2345 (!) 167/83 -- -- -- -- 92 % -- --   08/04/22 2330 (!) 157/78 -- -- -- -- 95 % -- --   08/04/22 2315 (!) 148/67 -- -- -- -- 94 % -- --   08/04/22 2300 (!) 145/67 -- -- -- -- 98 % -- --   08/04/22 2245 132/71 -- -- -- -- 92 % -- --   08/04/22 2230 130/72 -- -- -- -- 94 % -- --   08/04/22 2215 131/74 -- -- -- -- 94 % -- --   08/04/22 2200 (!) 141/75 -- -- -- -- 90 % -- --   08/04/22 1850 (!) 162/71 97.8 °F (36.6 °C) -- 77 20 95 % -- --     Constitutional: Patient in no acute distress. Neuro: Alert and oriented to person, place and time.   Psych: mood and affect normal  HEENT negative  Lungs: Respiratory effort is normal  Cardiovascular: Normal peripheral pulses  Abdomen: Soft, non-tender, non-distended with no CVA, flank pain or hepatosplenomegaly. No hernias. Kidneys normal.  Lymphatics: No palpable lymphadenopathy. Bladder non-tender and not distended. Pelvic exam: deferred  Rectal exam not indicated    LABS:   Recent Labs     08/04/22  1859   WBC 17.0*   HGB 13.8   HCT 41.3   MCV 94.9        Recent Labs     08/04/22  1859      K 4.0      CO2 22   BUN 17   CREATININE 0.94*       Additional Lab/culture results:    Urinalysis:   Recent Labs     08/04/22  2323   COLORU Yellow   PHUR 6.0   WBCUA 0 TO 2   RBCUA 2 TO 5   SPECGRAV >1.030*   LEUKOCYTESUR NEGATIVE   UROBILINOGEN Normal   BILIRUBINUR NEGATIVE        -----------------------------------------------------------------  Imaging Results:      Assessment and Plan   Impression:    Patient Active Problem List   Diagnosis    Ureteral calculus       Plan: We will plan for urgent left ureteral stenting. Patient will be admitted to the floor to the hospitalist service. Once her white blood cell count improves she may be discharged home. We will follow her cultures.   Definitive stone therapy in the next 1 to 2 weeks with holmium laser lithotripsy    Reyes De La Cruz MD  7:25 AM 8/5/2022

## 2022-08-06 VITALS
OXYGEN SATURATION: 96 % | HEIGHT: 67 IN | HEART RATE: 62 BPM | BODY MASS INDEX: 39.1 KG/M2 | SYSTOLIC BLOOD PRESSURE: 141 MMHG | DIASTOLIC BLOOD PRESSURE: 77 MMHG | RESPIRATION RATE: 16 BRPM | WEIGHT: 249.1 LBS | TEMPERATURE: 97.2 F

## 2022-08-06 PROBLEM — D72.829 LEUKOCYTOSIS: Status: ACTIVE | Noted: 2022-08-06

## 2022-08-06 LAB
ABSOLUTE EOS #: 0.04 K/UL (ref 0–0.44)
ABSOLUTE EOS #: <0.03 K/UL (ref 0–0.44)
ABSOLUTE IMMATURE GRANULOCYTE: 0.08 K/UL (ref 0–0.3)
ABSOLUTE IMMATURE GRANULOCYTE: 0.09 K/UL (ref 0–0.3)
ABSOLUTE LYMPH #: 3.12 K/UL (ref 1.1–3.7)
ABSOLUTE LYMPH #: 4.83 K/UL (ref 1.1–3.7)
ABSOLUTE MONO #: 0.92 K/UL (ref 0.1–1.2)
ABSOLUTE MONO #: 1.09 K/UL (ref 0.1–1.2)
ANION GAP SERPL CALCULATED.3IONS-SCNC: 9 MMOL/L (ref 9–17)
BACTERIA: ABNORMAL
BASOPHILS # BLD: 0 % (ref 0–2)
BASOPHILS # BLD: 0 % (ref 0–2)
BASOPHILS ABSOLUTE: 0.04 K/UL (ref 0–0.2)
BASOPHILS ABSOLUTE: 0.04 K/UL (ref 0–0.2)
BILIRUBIN URINE: NEGATIVE
BUN BLDV-MCNC: 13 MG/DL (ref 6–20)
BUN/CREAT BLD: 19 (ref 9–20)
CALCIUM SERPL-MCNC: 8.7 MG/DL (ref 8.6–10.4)
CHLORIDE BLD-SCNC: 106 MMOL/L (ref 98–107)
CO2: 24 MMOL/L (ref 20–31)
COLOR: ABNORMAL
CREAT SERPL-MCNC: 0.69 MG/DL (ref 0.5–0.9)
EOSINOPHILS RELATIVE PERCENT: 0 % (ref 1–4)
EOSINOPHILS RELATIVE PERCENT: 0 % (ref 1–4)
EPITHELIAL CELLS UA: ABNORMAL /HPF (ref 0–25)
GFR AFRICAN AMERICAN: >60 ML/MIN
GFR NON-AFRICAN AMERICAN: >60 ML/MIN
GFR SERPL CREATININE-BSD FRML MDRD: ABNORMAL ML/MIN/{1.73_M2}
GFR SERPL CREATININE-BSD FRML MDRD: ABNORMAL ML/MIN/{1.73_M2}
GLUCOSE BLD-MCNC: 136 MG/DL (ref 70–99)
GLUCOSE URINE: NEGATIVE
HCT VFR BLD CALC: 35.7 % (ref 36.3–47.1)
HCT VFR BLD CALC: 36.7 % (ref 36.3–47.1)
HEMOGLOBIN: 11.8 G/DL (ref 11.9–15.1)
HEMOGLOBIN: 11.9 G/DL (ref 11.9–15.1)
IMMATURE GRANULOCYTES: 0 %
IMMATURE GRANULOCYTES: 1 %
KETONES, URINE: NEGATIVE
LEUKOCYTE ESTERASE, URINE: ABNORMAL
LYMPHOCYTES # BLD: 17 % (ref 24–43)
LYMPHOCYTES # BLD: 30 % (ref 24–43)
MCH RBC QN AUTO: 31.1 PG (ref 25.2–33.5)
MCH RBC QN AUTO: 31.8 PG (ref 25.2–33.5)
MCHC RBC AUTO-ENTMCNC: 32.2 G/DL (ref 28.4–34.8)
MCHC RBC AUTO-ENTMCNC: 33.3 G/DL (ref 28.4–34.8)
MCV RBC AUTO: 95.5 FL (ref 82.6–102.9)
MCV RBC AUTO: 96.6 FL (ref 82.6–102.9)
MONOCYTES # BLD: 6 % (ref 3–12)
MONOCYTES # BLD: 6 % (ref 3–12)
NITRITE, URINE: NEGATIVE
NRBC AUTOMATED: 0 PER 100 WBC
NRBC AUTOMATED: 0 PER 100 WBC
PDW BLD-RTO: 12.8 % (ref 11.8–14.4)
PDW BLD-RTO: 13 % (ref 11.8–14.4)
PH UA: 6 (ref 5–9)
PLATELET # BLD: 240 K/UL (ref 138–453)
PLATELET # BLD: 313 K/UL (ref 138–453)
PMV BLD AUTO: 10.1 FL (ref 8.1–13.5)
PMV BLD AUTO: 9.7 FL (ref 8.1–13.5)
POTASSIUM SERPL-SCNC: 3.9 MMOL/L (ref 3.7–5.3)
PROTEIN UA: ABNORMAL
RBC # BLD: 3.74 M/UL (ref 3.95–5.11)
RBC # BLD: 3.8 M/UL (ref 3.95–5.11)
RBC UA: ABNORMAL /HPF (ref 0–2)
SEG NEUTROPHILS: 63 % (ref 36–65)
SEG NEUTROPHILS: 77 % (ref 36–65)
SEGMENTED NEUTROPHILS ABSOLUTE COUNT: 10.02 K/UL (ref 1.5–8.1)
SEGMENTED NEUTROPHILS ABSOLUTE COUNT: 14.04 K/UL (ref 1.5–8.1)
SODIUM BLD-SCNC: 139 MMOL/L (ref 135–144)
SPECIFIC GRAVITY UA: 1.01 (ref 1.01–1.02)
TURBIDITY: ABNORMAL
URINE HGB: ABNORMAL
UROBILINOGEN, URINE: NORMAL
WBC # BLD: 16 K/UL (ref 3.5–11.3)
WBC # BLD: 18.4 K/UL (ref 3.5–11.3)
WBC UA: ABNORMAL /HPF (ref 0–5)

## 2022-08-06 PROCEDURE — 36415 COLL VENOUS BLD VENIPUNCTURE: CPT

## 2022-08-06 PROCEDURE — 94761 N-INVAS EAR/PLS OXIMETRY MLT: CPT

## 2022-08-06 PROCEDURE — G0378 HOSPITAL OBSERVATION PER HR: HCPCS

## 2022-08-06 PROCEDURE — 85025 COMPLETE CBC W/AUTO DIFF WBC: CPT

## 2022-08-06 PROCEDURE — 87086 URINE CULTURE/COLONY COUNT: CPT

## 2022-08-06 PROCEDURE — 6370000000 HC RX 637 (ALT 250 FOR IP): Performed by: UROLOGY

## 2022-08-06 PROCEDURE — 80048 BASIC METABOLIC PNL TOTAL CA: CPT

## 2022-08-06 PROCEDURE — 81001 URINALYSIS AUTO W/SCOPE: CPT

## 2022-08-06 PROCEDURE — 2580000003 HC RX 258: Performed by: UROLOGY

## 2022-08-06 RX ORDER — ONDANSETRON 4 MG/1
4 TABLET, ORALLY DISINTEGRATING ORAL EVERY 8 HOURS PRN
Qty: 42 TABLET | Refills: 0 | Status: SHIPPED | OUTPATIENT
Start: 2022-08-06 | End: 2022-08-19

## 2022-08-06 RX ORDER — LEVOFLOXACIN 750 MG/1
750 TABLET ORAL DAILY
Qty: 7 TABLET | Refills: 0 | Status: SHIPPED | OUTPATIENT
Start: 2022-08-06 | End: 2022-08-13

## 2022-08-06 RX ADMIN — SODIUM CHLORIDE: 9 INJECTION, SOLUTION INTRAVENOUS at 05:22

## 2022-08-06 RX ADMIN — LISINOPRIL 10 MG: 10 TABLET ORAL at 08:26

## 2022-08-06 ASSESSMENT — PAIN SCALES - GENERAL: PAINLEVEL_OUTOF10: 0

## 2022-08-06 NOTE — PROGRESS NOTES
Pt awake in bed upon entering. Denies pain or discomfort at this time. Stating she really wants to go home. Asked for a warm blanket. Call light in reach. Will continue to monitor.

## 2022-08-06 NOTE — PLAN OF CARE
Problem: Discharge Planning  Goal: Discharge to home or other facility with appropriate resources  8/6/2022 0838 by Hakeem Arceo RN  Outcome: Progressing     Problem: Pain  Goal: Verbalizes/displays adequate comfort level or baseline comfort level  8/6/2022 0838 by Hakeem Arceo RN  Outcome: Progressing     Problem: Genitourinary - Adult  Goal: Absence of urinary retention  8/6/2022 0838 by Hakeem Arceo RN  Outcome: Progressing

## 2022-08-06 NOTE — PROGRESS NOTES
Pt instructed on proper way to collect urine using sanitary wipes and keeping container sanitary. Pt completed collection by clean cath. Urine is light pink in color, hazy, no odor. Urine sent to lab for UA C&S, as ordered.

## 2022-08-06 NOTE — PROGRESS NOTES
[address], [address]    Progress Note    Date:   8/6/2022  Patient name:  Vidhya Conway  Date of admission:  8/4/2022  6:53 PM  MRN:   [id number]  YOB: 1975    SUBJECTIVE/Last 24 hours update:     Patient seen and examined at the bed side , no new acute events overnight, no new complains except some ongoing urgency throughout the night. The patient has been having increased amounts of anxiety since being in the hospital and she was not able to sleep well overnight. I discussed with her the indications and reasons for the hospitalization this morning. CBC indicated leukocytosis. Patient has remained afebrile, VSS. Urine culture was sent and is pending. Notes from nursing staff and Consults had been reviewed, and the overnight progress had been checked with the nursing staff as well. REVIEW OF SYSTEMS:      CONSTITUTIONAL:  no fevers, no headcahes  EYES: negative for blury vision  HEENT: No headaches, No nasal congestion, no difficulty swallowing  RESPIRATORY:negative for dyspnea, no wheezing, no Cough  CARDIOVASCULAR: negative for chest pain, no palpitations  GASTROINTESTINAL: no nausea, no vomiting, no change in bowel habits, no abdominal pain   GENITOURINARY: negative for dysuria, no hematuria, positive for urgency/frequency. MUSCULOSKELETAL: no joint pains, no muscle aches, no swelling of joints or extremities  NEUROLOGICAL: No  Weakness or numbness      PAST MEDICAL HISTORY:      has a past medical history of Hyperlipidemia and Hypertension. PAST SURGICAL HISTORY:      has a past surgical history that includes Bladder surgery; Cystoscopy w/ ureteral stent placement (Left, 08/05/2022); and Cystocopy (Left, 08/05/2022). SOCIAL HISTORY:      reports that she has been smoking cigarettes. She has been smoking an average of .5 packs per day. She has never used smokeless tobacco.    TOBACCO:   reports that she has been smoking cigarettes.  She has been smoking an average of .5 packs per day. She has never used smokeless tobacco.  ETOH:   has no history on file for alcohol use. FAMILY HISTORY:     History reviewed. No pertinent family history. HOME MEDICATIONS:      Prior to Admission medications    Medication Sig Start Date End Date Taking? Authorizing Provider   ondansetron (ZOFRAN-ODT) 4 MG disintegrating tablet Take 1 tablet by mouth every 8 hours as needed for Nausea or Vomiting 8/6/22  Yes Rafael Calix MD   levoFLOXacin (LEVAQUIN) 750 MG tablet Take 1 tablet by mouth in the morning for 7 days. 8/6/22 8/13/22 Yes Rafael Calix MD   lisinopril (PRINIVIL;ZESTRIL) 10 MG tablet Take 10 mg by mouth in the morning. Yes Historical Provider, MD   atorvastatin (LIPITOR) 40 MG tablet Take 40 mg by mouth in the morning. Yes Historical Provider, MD       ALLERGIES:     Sulfa antibiotics      OBJECTIVE:       Vitals:    08/06/22 0324 08/06/22 0329 08/06/22 0703 08/06/22 0826   BP:  (!) 167/63 (!) 141/77 (!) 141/77   Pulse:  66 62    Resp:  16 16    Temp:  96.8 °F (36 °C) 97.2 °F (36.2 °C)    TempSrc:  Temporal Temporal    SpO2:  95% 96%    Weight: 249 lb 1.6 oz (113 kg)      Height:             Intake/Output Summary (Last 24 hours) at 8/6/2022 1540  Last data filed at 8/6/2022 1208  Gross per 24 hour   Intake 2090.02 ml   Output 850 ml   Net 1240.02 ml       PHYSICAL EXAM:  General Appearance  Alert , awake , not in acute distress  HEENT - Head is normocephalic, atraumatic. Lungs - Bilateral equal air entry , no wheezes, rales or rhonchi, aeration good  Cardiovascular - Heart sounds are normal.  Regular rhythm, normal rate without murmur, gallop or rub.   Abdomen - Soft, nontender, nondistended, no masses or organomegaly  Neurologic - There are no new focal motor or sensory deficits  Skin - No bruising or bleeding on exposed skin area  Extremities - No cyanosis, clubbing or edema      DIAGNOSTICS:     Laboratory Testing:    Recent Results (from the past 24 hour(s))   Basic metabolic panel    Collection Time: 08/06/22  6:29 AM   Result Value Ref Range    Glucose 136 (H) 70 - 99 mg/dL    BUN 13 6 - 20 mg/dL    Creatinine 0.69 0.50 - 0.90 mg/dL    Bun/Cre Ratio 19 9 - 20    Calcium 8.7 8.6 - 10.4 mg/dL    Sodium 139 135 - 144 mmol/L    Potassium 3.9 3.7 - 5.3 mmol/L    Chloride 106 98 - 107 mmol/L    CO2 24 20 - 31 mmol/L    Anion Gap 9 9 - 17 mmol/L    GFR Non-African American >60 >60 mL/min    GFR African American >60 >60 mL/min    GFR Comment          GFR Staging         CBC with Auto Differential    Collection Time: 08/06/22  6:29 AM   Result Value Ref Range    WBC 18.4 (H) 3.5 - 11.3 k/uL    RBC 3.80 (L) 3.95 - 5.11 m/uL    Hemoglobin 11.8 (L) 11.9 - 15.1 g/dL    Hematocrit 36.7 36.3 - 47.1 %    MCV 96.6 82.6 - 102.9 fL    MCH 31.1 25.2 - 33.5 pg    MCHC 32.2 28.4 - 34.8 g/dL    RDW 12.8 11.8 - 14.4 %    Platelets 229 849 - 036 k/uL    MPV 10.1 8.1 - 13.5 fL    NRBC Automated 0.0 0.0 per 100 WBC    Seg Neutrophils 77 (H) 36 - 65 %    Lymphocytes 17 (L) 24 - 43 %    Monocytes 6 3 - 12 %    Eosinophils % 0 (L) 1 - 4 %    Basophils 0 0 - 2 %    Immature Granulocytes 0 0 %    Segs Absolute 14.04 (H) 1.50 - 8.10 k/uL    Absolute Lymph # 3.12 1.10 - 3.70 k/uL    Absolute Mono # 1.09 0.10 - 1.20 k/uL    Absolute Eos # <0.03 0.00 - 0.44 k/uL    Basophils Absolute 0.04 0.00 - 0.20 k/uL    Absolute Immature Granulocyte 0.08 0.00 - 0.30 k/uL   Urinalysis with Reflex to Culture    Collection Time: 08/06/22 12:15 PM    Specimen: Urine   Result Value Ref Range    Color, UA Red (A) Yellow    Turbidity UA Cloudy (A) Clear    Glucose, Ur NEGATIVE NEGATIVE    Bilirubin Urine NEGATIVE NEGATIVE    Ketones, Urine NEGATIVE NEGATIVE    Specific [place], UA 1.010 1.010 - 1.020    Urine Hgb 3+ (A) NEGATIVE    pH, UA 6.0 5.0 - 9.0    Protein, UA 1+ (A) NEGATIVE    Urobilinogen, Urine Normal Normal    Nitrite, Urine NEGATIVE NEGATIVE    Leukocyte Esterase, Urine SMALL (A) NEGATIVE Microscopic Urinalysis    Collection Time: 08/06/22 12:15 PM   Result Value Ref Range    WBC, UA 2 TO 5 0 - 5 /HPF    RBC, UA 50  0 - 2 /HPF    Epithelial Cells UA 0 TO 2 0 - 25 /HPF    Bacteria, UA TRACE (A) None   CBC with Auto Differential    Collection Time: 08/06/22  3:10 PM   Result Value Ref Range    WBC 16.0 (H) 3.5 - 11.3 k/uL    RBC 3.74 (L) 3.95 - 5.11 m/uL    Hemoglobin 11.9 11.9 - 15.1 g/dL    Hematocrit 35.7 (L) 36.3 - 47.1 %    MCV 95.5 82.6 - 102.9 fL    MCH 31.8 25.2 - 33.5 pg    MCHC 33.3 28.4 - 34.8 g/dL    RDW 13.0 11.8 - 14.4 %    Platelets 524 315 - 544 k/uL    MPV 9.7 8.1 - 13.5 fL    NRBC Automated 0.0 0.0 per 100 WBC    Seg Neutrophils PENDING %    Lymphocytes PENDING %    Monocytes PENDING %    Eosinophils % PENDING %    Basophils PENDING %    Immature Granulocytes PENDING 0 %    Segs Absolute PENDING k/uL    Absolute Lymph # PENDING k/uL    Absolute Mono # PENDING k/uL    Absolute Eos # PENDING k/uL    Basophils Absolute PENDING 0.0 - 0.2 k/uL    Absolute Immature Granulocyte PENDING 0.00 - 0.30 k/uL       Current Facility-Administered Medications   Medication Dose Route Frequency Provider Last Rate Last Admin    atorvastatin (LIPITOR) tablet 40 mg  40 mg Oral Nightly Asa Hay MD   40 mg at 08/05/22 2029    lisinopril (PRINIVIL;ZESTRIL) tablet 10 mg  10 mg Oral Daily Asa Hay MD   10 mg at 08/06/22 0826    0.9 % sodium chloride infusion   IntraVENous Continuous Asa Hay  mL/hr at 08/06/22 0522 New Bag at 08/06/22 0522    sodium chloride flush 0.9 % injection 5-40 mL  5-40 mL IntraVENous 2 times per day Asa Hay MD        sodium chloride flush 0.9 % injection 5-40 mL  5-40 mL IntraVENous PRN sAa Hay MD        0.9 % sodium chloride infusion   IntraVENous PRN Asa Hay MD        ondansetron (ZOFRAN-ODT) disintegrating tablet 4 mg  4 mg Oral Q8H PRN Asa Hay MD        Or    ondansetron [facility]) injection 4 mg  4 mg IntraVENous Q6H PRN Renetta Gross MD        polyethylene glycol [facility]) packet 17 g  17 g Oral Daily PRN Renetta Gross MD   17 g at 08/05/22 1856    cefTRIAXone (ROCEPHIN) 1,000 mg in dextrose 5 % 50 mL IVPB mini-bag  1,000 mg IntraVENous Q24H Renetta Gross MD   Stopped at 08/05/22 2054       ASSESSMENT:     Principal Problem:    Ureteral calculus  Active Problems:    Hydronephrosis    Leukocytosis  Resolved Problems:    * No resolved hospital problems. *      PLAN:     Discussed care plan with nurse after getting their input. Ureteral Calculus, Hydronephrosis, Leukocytosis  Case discussed with Urology  Monitor CBC  CBC notable for Leukocytosis  UA/Urine culture sent and is pending at this time  S/p 2 days of Ceftriaxone  Pain control as needed  [email or web address]  Continued statin/Lisinopril home meds  Patient's clinical status has been stable throughout her stay    Plan for patient to be sent home on PO Abx and close f/u with Urology which will follow-up her Urine Cultures and re-evaluate her in the office. Above plan discussed with the patient who agreed to the above plan     Please note that this chart was generated using voice recognition Dragon dictation software. Although every effort was made to ensure the accuracy of this automated transcription, some errors in transcription may have occurred.     Sara Mathur MD  8/6/2022 3:40 PM

## 2022-08-06 NOTE — OP NOTE
[address]                                OPERATIVE REPORT    PATIENT NAME: Dede Santana                     :        1975  MED REC NO:   [id number]                              ROOM:       0330  ACCOUNT NO:   [de-identified]                           ADMIT DATE: 2022  PROVIDER:     Hedy Reyes    DATE OF PROCEDURE:  2022    SURGEON:  Dr. Hedy Reyes. ASSISTANT:  None. PREOPERATIVE DIAGNOSIS:  Left ureteral calculus. POSTOPERATIVE DIAGNOSIS:  Left ureteral calculus. PROCEDURE PERFORMED:  Cystoscopy and left ureteral stent placement. ANESTHESIA:  General.    COMPLICATIONS:  None. ESTIMATED BLOOD LOSS:  Minimal.    SPECIMENS:  None. PROSTHESIS:  A 6-Indonesian x 26-cm double-J ureteral stent. DISPOSITION:  Stable. FINDINGS:  Left ureteral obstruction. INDICATIONS:  The patient is a 68-year-old female who presented to the  emergency department with left-sided flank pain. She did have a  CT-proven proximal left ureteral stone and a white blood cell count of  17,000, here now for temporizing therapy in the form of ureteral stent  placement. DESCRIPTION OF PROCEDURE:  The patient was taken back to the operating  room after informed consent including all risks, benefits, and  alternatives were obtained. The patient was transferred from the [facility]  onto the operating table, where she was induced under general  anesthesia, given IV Cipro for preoperative antibiotic prophylaxis. To  begin the case, she was prepped and draped in normal sterile fashion,  placed in dorsal lithotomy. She had a 22-Indonesian sheath with a 30-degree  lens passed through the urethra into the bladder. Once in the bladder,  we identified the left ureteral orifice. A 0.035-inch wire was passed  up the left ureter into the kidney.   We were then able to place a  6-Indonesian x 26-cm double-J ureteral stent over the Glidewire up into the  kidney. Glidewire was removed. Proximal curl was confirmed via  fluoroscopy. Distal curl was confirmed via visualization. At this  point in time, the patient's bladder was drained. She was awoken from  general anesthesia, transferred to the [facility], and taken to the PACU in  satisfactory condition by the Nursing and Anesthesia teams. PLAN:  The patient will be admitted to the floor for observation by the  hospitalist.  She will follow up with us in one to two weeks for  definitive stone therapy _____ cultures are obtained.         Edy Wilks    D: 08/05/2022 12:34:53       T: 08/05/2022 12:57:28     [email or web address]  Job#: [id number]     Doc#: [id number]    CC:

## 2022-08-06 NOTE — PROGRESS NOTES
Went over D/C orders with pt and . Denies questions or concerns at this time. Pt and all belongings taken down to personal vehicle.

## 2022-08-06 NOTE — PLAN OF CARE
Problem: Discharge Planning  Goal: Discharge to home or other facility with appropriate resources  Outcome: Progressing  Flowsheets (Taken 8/5/2022 2204)  Discharge to home or other facility with appropriate resources:   Identify barriers to discharge with patient and caregiver   Arrange for needed discharge resources and transportation as appropriate   Identify discharge learning needs (meds, wound care, etc)     Problem: Pain  Goal: Verbalizes/displays adequate comfort level or baseline comfort level  Outcome: Progressing  Flowsheets (Taken 8/5/2022 2204)  Verbalizes/displays adequate comfort level or baseline comfort level:   Encourage patient to monitor pain and request assistance   Assess pain using appropriate pain scale   Administer analgesics based on type and severity of pain and evaluate response   Implement non-pharmacological measures as appropriate and evaluate response   Consider cultural and social influences on pain and pain management   Notify Licensed Independent Practitioner if interventions unsuccessful or patient reports new pain     Problem: Genitourinary - Adult  Goal: Absence of urinary retention  Outcome: Progressing  Flowsheets (Taken 8/5/2022 2204)  Absence of urinary retention:   Assess patients ability to void and empty bladder   Monitor intake/output and perform bladder scan as needed

## 2022-08-06 NOTE — DISCHARGE SUMMARY
[address], [facility]    Discharge Summary      NAME:  Shirley Phillips  :  1975  MRN:  [id number]    Admit date:  2022  Discharge date:  22    Admitting Physician:  Tre Vicente MD    Primary Diagnosis on Admission:   Present on Admission:   Ureteral calculus   Hydronephrosis   Leukocytosis      Secondary Diagnoses:  does not have any pertinent problems on file. Admission Condition:  stable     Discharged Condition: stable    Hospital Course: The patient was admitted for the management of left ureteral calculi and hydronephrosis. Urology had been consulted and she had a cystoscopy with left ureteral stent placement. She was started on IVF and IV Antibiotics. Labs were monitored as well as Vitals. Today on day of discharge pt feels better. Vitals and Labs are stable. Repeat CBC indicated some resolution of her Leukocytosis. The patient wanted to be discharged today with close OP follow-up. All consultants involved during this admission are agreeable to d/c. Consults:  Urology    Significant Diagnostic/theraputic interventions: IVF, IV Antibiotics, Cystoscopy, Left Ureteral Stent placement. Disposition:   home    Instructions to Patient:      Follow up with Valeria Alberto within 1 week   Follow up with Urology/ within 1-2 weeks as planned prior. Discharge Medications:       Medication List        START taking these medications      levoFLOXacin 750 MG tablet  Commonly known as: LEVAQUIN  Take 1 tablet by mouth in the morning for 7 days.      ondansetron 4 MG disintegrating tablet  Commonly known as: ZOFRAN-ODT  Take 1 tablet by mouth every 8 hours as needed for Nausea or Vomiting            CONTINUE taking these medications      atorvastatin 40 MG tablet  Commonly known as: LIPITOR     lisinopril 10 MG tablet  Commonly known as: PRINIVIL;ZESTRIL               Where to Get Your Medications        These medications were sent to [address] [place] [address]

## 2022-08-08 ENCOUNTER — TELEPHONE (OUTPATIENT)
Dept: UROLOGY | Age: 47
End: 2022-08-08

## 2022-08-08 ENCOUNTER — HOSPITAL ENCOUNTER (EMERGENCY)
Age: 47
Discharge: HOME | End: 2022-08-09
Attending: EMERGENCY MEDICINE
Payer: COMMERCIAL

## 2022-08-08 DIAGNOSIS — R10.9 FLANK PAIN: Primary | ICD-10-CM

## 2022-08-08 DIAGNOSIS — D72.829 LEUKOCYTOSIS, UNSPECIFIED TYPE: Primary | ICD-10-CM

## 2022-08-08 LAB
BACTERIA: ABNORMAL
BILIRUBIN URINE: NEGATIVE
COLOR: YELLOW
CULTURE: NO GROWTH
EPITHELIAL CELLS UA: ABNORMAL /HPF (ref 0–25)
GLUCOSE URINE: NEGATIVE
KETONES, URINE: NEGATIVE
LEUKOCYTE ESTERASE, URINE: ABNORMAL
NITRITE, URINE: NEGATIVE
PH UA: 6 (ref 5–9)
PROTEIN UA: NEGATIVE
RBC UA: ABNORMAL /HPF (ref 0–2)
SPECIFIC GRAVITY UA: <1.005 (ref 1.01–1.02)
SPECIMEN DESCRIPTION: NORMAL
TURBIDITY: CLEAR
URINE HGB: ABNORMAL
UROBILINOGEN, URINE: NORMAL
WBC UA: ABNORMAL /HPF (ref 0–5)

## 2022-08-08 PROCEDURE — 99284 EMERGENCY DEPT VISIT MOD MDM: CPT

## 2022-08-08 PROCEDURE — 2580000003 HC RX 258: Performed by: EMERGENCY MEDICINE

## 2022-08-08 PROCEDURE — 96374 THER/PROPH/DIAG INJ IV PUSH: CPT

## 2022-08-08 PROCEDURE — 85025 COMPLETE CBC W/AUTO DIFF WBC: CPT

## 2022-08-08 PROCEDURE — 81001 URINALYSIS AUTO W/SCOPE: CPT

## 2022-08-08 PROCEDURE — 80048 BASIC METABOLIC PNL TOTAL CA: CPT

## 2022-08-08 RX ORDER — 0.9 % SODIUM CHLORIDE 0.9 %
1000 INTRAVENOUS SOLUTION INTRAVENOUS ONCE
Status: COMPLETED | OUTPATIENT
Start: 2022-08-08 | End: 2022-08-09

## 2022-08-08 RX ORDER — KETOROLAC TROMETHAMINE 15 MG/ML
15 INJECTION, SOLUTION INTRAMUSCULAR; INTRAVENOUS ONCE
Status: COMPLETED | OUTPATIENT
Start: 2022-08-08 | End: 2022-08-09

## 2022-08-08 RX ADMIN — SODIUM CHLORIDE 1000 ML: 9 INJECTION, SOLUTION INTRAVENOUS at 23:59

## 2022-08-08 ASSESSMENT — PAIN DESCRIPTION - PAIN TYPE: TYPE: ACUTE PAIN

## 2022-08-08 ASSESSMENT — PAIN DESCRIPTION - FREQUENCY: FREQUENCY: CONTINUOUS

## 2022-08-08 ASSESSMENT — PAIN DESCRIPTION - LOCATION: LOCATION: BACK

## 2022-08-08 ASSESSMENT — PAIN DESCRIPTION - DESCRIPTORS: DESCRIPTORS: SHARP

## 2022-08-08 ASSESSMENT — PAIN SCALES - GENERAL: PAINLEVEL_OUTOF10: 7

## 2022-08-08 ASSESSMENT — PAIN DESCRIPTION - ORIENTATION: ORIENTATION: LEFT

## 2022-08-08 ASSESSMENT — PAIN - FUNCTIONAL ASSESSMENT: PAIN_FUNCTIONAL_ASSESSMENT: 0-10

## 2022-08-08 NOTE — TELEPHONE ENCOUNTER
Patient had cystoscopy with stent placement on 8/5/22. Called patient to schedule HLL with stent exchange. Patient agreeable to be scheduled for 8/23/22. She is scheduled in office on 8/19/22 to discuss surgery. She is asking if she will have repeat CBC prior to surgery.

## 2022-08-08 NOTE — TELEPHONE ENCOUNTER
Called patient and informed her to keep appointment on 8/19 and to have CBC day prior. Patient voiced understanding.

## 2022-08-09 ENCOUNTER — TELEPHONE (OUTPATIENT)
Dept: UROLOGY | Age: 47
End: 2022-08-09

## 2022-08-09 VITALS
SYSTOLIC BLOOD PRESSURE: 163 MMHG | OXYGEN SATURATION: 98 % | DIASTOLIC BLOOD PRESSURE: 86 MMHG | RESPIRATION RATE: 18 BRPM | HEART RATE: 78 BPM | TEMPERATURE: 97.9 F

## 2022-08-09 LAB
ABSOLUTE EOS #: 0.32 K/UL (ref 0–0.44)
ABSOLUTE IMMATURE GRANULOCYTE: 0 K/UL (ref 0–0.3)
ABSOLUTE LYMPH #: 5.92 K/UL (ref 1.1–3.7)
ABSOLUTE MONO #: 0.64 K/UL (ref 0.1–1.2)
ANION GAP SERPL CALCULATED.3IONS-SCNC: 12 MMOL/L (ref 9–17)
BASOPHILS # BLD: 0 % (ref 0–2)
BASOPHILS ABSOLUTE: 0 K/UL (ref 0–0.2)
BUN BLDV-MCNC: 15 MG/DL (ref 6–20)
BUN/CREAT BLD: 19 (ref 9–20)
CALCIUM SERPL-MCNC: 9.4 MG/DL (ref 8.6–10.4)
CHLORIDE BLD-SCNC: 103 MMOL/L (ref 98–107)
CO2: 24 MMOL/L (ref 20–31)
CREAT SERPL-MCNC: 0.8 MG/DL (ref 0.5–0.9)
EOSINOPHILS RELATIVE PERCENT: 2 % (ref 1–4)
GFR AFRICAN AMERICAN: >60 ML/MIN
GFR NON-AFRICAN AMERICAN: >60 ML/MIN
GFR SERPL CREATININE-BSD FRML MDRD: ABNORMAL ML/MIN/{1.73_M2}
GFR SERPL CREATININE-BSD FRML MDRD: ABNORMAL ML/MIN/{1.73_M2}
GLUCOSE BLD-MCNC: 118 MG/DL (ref 70–99)
HCT VFR BLD CALC: 39.4 % (ref 36.3–47.1)
HEMOGLOBIN: 13.3 G/DL (ref 11.9–15.1)
IMMATURE GRANULOCYTES: 0 %
LYMPHOCYTES # BLD: 37 % (ref 24–43)
MCH RBC QN AUTO: 31.9 PG (ref 25.2–33.5)
MCHC RBC AUTO-ENTMCNC: 33.8 G/DL (ref 28.4–34.8)
MCV RBC AUTO: 94.5 FL (ref 82.6–102.9)
MONOCYTES # BLD: 4 % (ref 3–12)
MORPHOLOGY: NORMAL
NRBC AUTOMATED: 0 PER 100 WBC
PDW BLD-RTO: 12.5 % (ref 11.8–14.4)
PLATELET # BLD: 367 K/UL (ref 138–453)
PMV BLD AUTO: 9.4 FL (ref 8.1–13.5)
POTASSIUM SERPL-SCNC: 3.7 MMOL/L (ref 3.7–5.3)
RBC # BLD: 4.17 M/UL (ref 3.95–5.11)
SEG NEUTROPHILS: 57 % (ref 36–65)
SEGMENTED NEUTROPHILS ABSOLUTE COUNT: 9.12 K/UL (ref 1.5–8.1)
SODIUM BLD-SCNC: 139 MMOL/L (ref 135–144)
WBC # BLD: 16 K/UL (ref 3.5–11.3)

## 2022-08-09 PROCEDURE — 6360000002 HC RX W HCPCS: Performed by: EMERGENCY MEDICINE

## 2022-08-09 RX ORDER — KETOROLAC TROMETHAMINE 10 MG/1
10 TABLET, FILM COATED ORAL EVERY 6 HOURS PRN
Qty: 20 TABLET | Refills: 0 | Status: SHIPPED | OUTPATIENT
Start: 2022-08-09 | End: 2022-08-19

## 2022-08-09 RX ADMIN — KETOROLAC TROMETHAMINE 15 MG: 15 INJECTION, SOLUTION INTRAMUSCULAR; INTRAVENOUS at 00:00

## 2022-08-09 ASSESSMENT — PAIN - FUNCTIONAL ASSESSMENT: PAIN_FUNCTIONAL_ASSESSMENT: NONE - DENIES PAIN

## 2022-08-09 NOTE — TELEPHONE ENCOUNTER
Called patient and informed her that Toradol sent to pharmacy and to keep appointment on 8/19 with CBC prior

## 2022-08-09 NOTE — TELEPHONE ENCOUNTER
Please let her know that we are aware that she was in the emergency room. Her white blood cell count remains elevated. Kidney function was normal. her previous urine culture was negative. We do agree with her continuing antibiotics. We did send her Toradol for her pain.   Keep appointment 8/19/2022

## 2022-08-09 NOTE — ED PROVIDER NOTES
[facility]  EMERGENCY DEPARTMENT ENCOUNTER      Pt Name: Shannon Carver  MRN: [id number]  [place] 1975  Date of evaluation: 8/8/2022  Provider: Tiffanie Roth MD    [address] [place] [address]       Chief Complaint   Patient presents with    Flank Pain     Started at 2100, Left side         HISTORY OF PRESENT ILLNESS   (Location/Symptom, Timing/Onset, Context/Setting, Quality, Duration, Modifying Factors, Severity)  Note limiting factors. Shannon Carver is a 50 y.o. female who presents to the emergency department concerning of left-sided flank pain that started this evening. States she recently had a stent put in for kidney stones and is on antibiotics. Did not take anything for pain prior to arrival.  Denies any difficulty urinating, chest pain or fevers. HPI    Nursing Notes were reviewed. REVIEW OF SYSTEMS    (2-9 systems for level 4, 10 or more for level 5)     Review of Systems   All other systems reviewed and are negative. Except as noted above the remainder of the review of systems was reviewed and negative. PAST MEDICAL HISTORY     Past Medical History:   Diagnosis Date    Hyperlipidemia     Hypertension          SURGICAL HISTORY       Past Surgical History:   Procedure Laterality Date    BLADDER SURGERY      CYSTOSCOPY Left 08/05/2022    Dr. Raymond Savage - with stent placement    CYSTOSCOPY W/ URETERAL STENT PLACEMENT Left 08/05/2022    with Dr. Brittany Preciado       Previous Medications    ATORVASTATIN (LIPITOR) 40 MG TABLET    Take 40 mg by mouth in the morning. LEVOFLOXACIN (LEVAQUIN) 750 MG TABLET    Take 1 tablet by mouth in the morning for 7 days. LISINOPRIL (PRINIVIL;ZESTRIL) 10 MG TABLET    Take 10 mg by mouth in the morning. ONDANSETRON (ZOFRAN-ODT) 4 MG DISINTEGRATING TABLET    Take 1 tablet by mouth every 8 hours as needed for Nausea or Vomiting       ALLERGIES     Sulfa antibiotics    FAMILY HISTORY     No family history on file.        SOCIAL chart in the absence of a cardiologist.        RADIOLOGY:   Non-plain film images such as CT, Ultrasound and MRI are read by the radiologist. Plain radiographic images are visualized and preliminarily interpreted by the emergency physician with the below findings:        Interpretation per the Radiologist below, if available at the time of this note:    No orders to display         ED BEDSIDE ULTRASOUND:   Performed by ED Physician - none    LABS:  Labs Reviewed   CBC WITH AUTO DIFFERENTIAL - Abnormal; Notable for the following components:       Result Value    WBC 16.0 (*)     Segs Absolute 9.12 (*)     Absolute Lymph # 5.92 (*)     All other components within normal limits   BASIC METABOLIC PANEL - Abnormal; Notable for the following components:    Glucose 118 (*)     All other components within normal limits   URINALYSIS WITH MICROSCOPIC - Abnormal; Notable for the following components:    Specific Gravity, UA <1.005 (*)     Urine Hgb 3+ (*)     Leukocyte Esterase, Urine TRACE (*)     Bacteria, UA TRACE (*)     All other components within normal limits   POC PREGNANCY UR-QUAL       All other labs were within normal range or not returned as of this dictation. EMERGENCY DEPARTMENT COURSE and DIFFERENTIAL DIAGNOSIS/MDM:   Vitals:    Vitals:    08/08/22 2337   BP: (!) 178/92   Pulse: 78   Resp: 18   Temp: 97.9 °F (36.6 °C)   TempSrc: Tympanic   SpO2: 95%         MDM  Number of Diagnoses or Management Options  Flank pain  Diagnosis management comments: 73-year-old female presented with flank pain recent stent placement. Exam was unremarkable urine demonstrated bacteria however patient was afebrile. Patient did have leukocytosis but previous value was the same and patient was already on antibiotics. Patient was provided with Toradol and reassessment patient pain has decided and patient was comfortable following accordingly.   At this time he did not have any concern for any acute emergent pathology and at time of discharge patient was not hypotensive or tachycardic was afebrile and otherwise acutely clinically stable. Amount and/or Complexity of Data Reviewed  Decide to obtain previous medical records or to obtain history from someone other than the patient: yes          [facility]   Total Critical Care time was  minutes, excluding separately reportable procedures. There was a high probability of clinically significant/life threatening deterioration in the patient's condition which required my urgent intervention. CONSULTS:  None    PROCEDURES:  Unless otherwise noted below, none     Procedures        FINAL IMPRESSION      1. Flank pain          DISPOSITION/PLAN   DISPOSITION Decision To Discharge 08/09/2022 12:33:52 AM      PATIENT REFERRED TO:  Reyes De La Cruz MD  [facility], [address]  [phone]    Call in 1 day      DISCHARGE MEDICATIONS:  New Prescriptions    No medications on file     Controlled Substances Monitoring:     No flowsheet data found.     (Please note that portions of this note were completed with a voice recognition program.  Efforts were made to edit the dictations but occasionally words are mis-transcribed.)    Tiffanie Roth MD (electronically signed)  Attending Emergency Physician            Tiffanie Roth MD  08/09/22 2011

## 2022-08-09 NOTE — DISCHARGE INSTRUCTIONS
Please attempt to use 400 mg of ibuprofen +500 mg of Tylenol every 4-6 hours.   If inability keep fluids down worsening symptoms please return to emergency department Subjective     HPI:   Sonali Morrell is a 60year old female. Reason for video visit:  Anxiety  This visit is conducted using Telemedicine with live, interactive video and audio. Son was arrested at college. This caused increase anxiety. She used xanax which helped her sleep, but then she woke up in a panic and was shaking. She went to the [facility] and is scheduled for group therapy. Was still shaking from anxiety, so went to ER. There she was given Klonopin. Given this 12 hours ago and was able to sleep and now feeling much more calm. Started on venlafaxine years ago for migraines. Only took once and felt very poorly. Interested in starting daily medication for future. Chief Complaint Reviewed and Verified  Nursing Notes Reviewed and   Verified  Tobacco Reviewed  Allergies Reviewed  Medications Reviewed    Problem List Reviewed            REVIEW OF SYSTEMS:  See HPI           Physical Exam:  Physical Exam:  Gen:  Alert  Resp:  Speaking in full sentences comfortably  Psych:  Does not appear anxious or depressed        Assessment    1. Situational anxiety  Plan to start sertraline, okay to begin with 1/2 tab. SEs reviewed. Can use klonopin prn for anxiety, sleep. SEs reviewed. Discussed this is not for long term use. She will update me in 2-3 weeks. Mario Chamberlain MD    The following visit was completed using two-way, real-time interactive audio and/or video communication. This has been done in good varinder to provide continuity of care in the best interest of the provider-patient relationship, due to the ongoing public health crisis/national emergency and because of restrictions of visitation. There are limitations of this visit, but every conscious effort was taken to allow for sufficient and adequate time. This billing was spent on reviewing labs, medications, radiology tests and decision making.   Appropriate medical decision-making and tests are ordered as detailed in the plan of care above.

## 2022-08-11 NOTE — PROGRESS NOTES
Patient instructed per phone interview on the pre-operative, intra-operative, and post-operative process, as well as NPO status. Patient instructed to hold Lisinopril the day of surgery, take other meds with sip of water only. Pre-operative instruction sheet reviewed with the patient as well as skin prep instructions. Verbalizes understanding.

## 2022-08-17 ENCOUNTER — HOSPITAL ENCOUNTER (OUTPATIENT)
Age: 47
Discharge: HOME | End: 2022-08-17
Payer: COMMERCIAL

## 2022-08-17 ENCOUNTER — TELEPHONE (OUTPATIENT)
Dept: UROLOGY | Age: 47
End: 2022-08-17

## 2022-08-17 DIAGNOSIS — D72.829 LEUKOCYTOSIS, UNSPECIFIED TYPE: ICD-10-CM

## 2022-08-17 LAB
ABSOLUTE EOS #: 0.25 K/UL (ref 0–0.44)
ABSOLUTE IMMATURE GRANULOCYTE: 0.06 K/UL (ref 0–0.3)
ABSOLUTE LYMPH #: 3.94 K/UL (ref 1.1–3.7)
ABSOLUTE MONO #: 0.84 K/UL (ref 0.1–1.2)
BASOPHILS # BLD: 0 % (ref 0–2)
BASOPHILS ABSOLUTE: 0.05 K/UL (ref 0–0.2)
EOSINOPHILS RELATIVE PERCENT: 2 % (ref 1–4)
HCT VFR BLD CALC: 40.1 % (ref 36.3–47.1)
HEMOGLOBIN: 13.3 G/DL (ref 11.9–15.1)
IMMATURE GRANULOCYTES: 1 %
LYMPHOCYTES # BLD: 30 % (ref 24–43)
MCH RBC QN AUTO: 31.9 PG (ref 25.2–33.5)
MCHC RBC AUTO-ENTMCNC: 33.2 G/DL (ref 28.4–34.8)
MCV RBC AUTO: 96.2 FL (ref 82.6–102.9)
MONOCYTES # BLD: 6 % (ref 3–12)
NRBC AUTOMATED: 0 PER 100 WBC
PDW BLD-RTO: 12.7 % (ref 11.8–14.4)
PLATELET # BLD: 350 K/UL (ref 138–453)
PMV BLD AUTO: 9.3 FL (ref 8.1–13.5)
RBC # BLD: 4.17 M/UL (ref 3.95–5.11)
SEG NEUTROPHILS: 61 % (ref 36–65)
SEGMENTED NEUTROPHILS ABSOLUTE COUNT: 8.16 K/UL (ref 1.5–8.1)
WBC # BLD: 13.3 K/UL (ref 3.5–11.3)

## 2022-08-17 PROCEDURE — 36415 COLL VENOUS BLD VENIPUNCTURE: CPT

## 2022-08-17 PROCEDURE — 85025 COMPLETE CBC W/AUTO DIFF WBC: CPT

## 2022-08-17 NOTE — TELEPHONE ENCOUNTER
----- Message from TIM Davies CNP sent at 8/17/2022  3:22 PM EDT -----  WBC continues to improved, today it is 13.3.  Keep surgery as planned

## 2022-08-19 ENCOUNTER — OFFICE VISIT (OUTPATIENT)
Dept: UROLOGY | Age: 47
End: 2022-08-19
Payer: COMMERCIAL

## 2022-08-19 VITALS
DIASTOLIC BLOOD PRESSURE: 80 MMHG | TEMPERATURE: 98.4 F | BODY MASS INDEX: 38.3 KG/M2 | WEIGHT: 244 LBS | HEART RATE: 73 BPM | SYSTOLIC BLOOD PRESSURE: 144 MMHG | HEIGHT: 67 IN

## 2022-08-19 DIAGNOSIS — N20.1 URETERAL CALCULUS: Primary | ICD-10-CM

## 2022-08-19 DIAGNOSIS — B37.31 YEAST VAGINITIS: ICD-10-CM

## 2022-08-19 PROCEDURE — 99214 OFFICE O/P EST MOD 30 MIN: CPT | Performed by: UROLOGY

## 2022-08-19 PROCEDURE — G8417 CALC BMI ABV UP PARAM F/U: HCPCS | Performed by: UROLOGY

## 2022-08-19 PROCEDURE — 4004F PT TOBACCO SCREEN RCVD TLK: CPT | Performed by: UROLOGY

## 2022-08-19 PROCEDURE — G8427 DOCREV CUR MEDS BY ELIG CLIN: HCPCS | Performed by: UROLOGY

## 2022-08-19 RX ORDER — MONTELUKAST SODIUM 10 MG/1
TABLET ORAL
COMMUNITY
Start: 2022-08-10

## 2022-08-19 RX ORDER — FLUCONAZOLE 100 MG/1
100 TABLET ORAL DAILY
Qty: 7 TABLET | Refills: 0 | Status: SHIPPED | OUTPATIENT
Start: 2022-08-19 | End: 2022-08-26

## 2022-08-19 RX ORDER — PANTOPRAZOLE SODIUM 40 MG/1
40 TABLET, DELAYED RELEASE ORAL DAILY
COMMUNITY
Start: 2022-06-13

## 2022-08-19 ASSESSMENT — ENCOUNTER SYMPTOMS
EYE REDNESS: 0
APNEA: 0
WHEEZING: 0
SHORTNESS OF BREATH: 0
NAUSEA: 0
BACK PAIN: 0
COUGH: 0
ABDOMINAL PAIN: 0
CONSTIPATION: 0
COLOR CHANGE: 0
VOMITING: 0

## 2022-08-19 NOTE — PATIENT INSTRUCTIONS
SURVEY:    You may be receiving a survey from [employer] regarding your visit today. Please complete the survey to enable us to provide the highest quality of care to you and your family. If you cannot score us a very good on any question, please call the office to discuss how we could have made your experience a very good one. Thank you.

## 2022-08-22 ENCOUNTER — ANESTHESIA EVENT (OUTPATIENT)
Dept: OPERATING ROOM | Age: 47
End: 2022-08-22
Payer: COMMERCIAL

## 2022-08-23 ENCOUNTER — ANESTHESIA (OUTPATIENT)
Dept: OPERATING ROOM | Age: 47
End: 2022-08-23
Payer: COMMERCIAL

## 2022-08-23 ENCOUNTER — APPOINTMENT (OUTPATIENT)
Dept: GENERAL RADIOLOGY | Age: 47
End: 2022-08-23
Attending: UROLOGY
Payer: COMMERCIAL

## 2022-08-23 ENCOUNTER — HOSPITAL ENCOUNTER (OUTPATIENT)
Age: 47
Setting detail: OUTPATIENT SURGERY
Discharge: HOME | End: 2022-08-23
Attending: UROLOGY | Admitting: UROLOGY
Payer: COMMERCIAL

## 2022-08-23 ENCOUNTER — HOSPITAL ENCOUNTER (EMERGENCY)
Age: 47
Discharge: HOME | End: 2022-08-23
Attending: EMERGENCY MEDICINE
Payer: COMMERCIAL

## 2022-08-23 VITALS
DIASTOLIC BLOOD PRESSURE: 71 MMHG | BODY MASS INDEX: 37.89 KG/M2 | SYSTOLIC BLOOD PRESSURE: 172 MMHG | OXYGEN SATURATION: 97 % | HEART RATE: 59 BPM | HEIGHT: 67 IN | TEMPERATURE: 97.1 F | RESPIRATION RATE: 16 BRPM | WEIGHT: 241.4 LBS

## 2022-08-23 VITALS
TEMPERATURE: 97 F | HEART RATE: 88 BPM | OXYGEN SATURATION: 97 % | RESPIRATION RATE: 16 BRPM | SYSTOLIC BLOOD PRESSURE: 140 MMHG | DIASTOLIC BLOOD PRESSURE: 80 MMHG

## 2022-08-23 DIAGNOSIS — N21.1: ICD-10-CM

## 2022-08-23 DIAGNOSIS — G89.18 POST-OP PAIN: Primary | ICD-10-CM

## 2022-08-23 LAB
ABSOLUTE EOS #: <0.03 K/UL (ref 0–0.44)
ABSOLUTE IMMATURE GRANULOCYTE: 0.16 K/UL (ref 0–0.3)
ABSOLUTE LYMPH #: 1.22 K/UL (ref 1.1–3.7)
ABSOLUTE MONO #: 0.35 K/UL (ref 0.1–1.2)
ANION GAP SERPL CALCULATED.3IONS-SCNC: 11 MMOL/L (ref 9–17)
BASOPHILS # BLD: 0 % (ref 0–2)
BASOPHILS ABSOLUTE: 0.03 K/UL (ref 0–0.2)
BUN BLDV-MCNC: 16 MG/DL (ref 6–20)
BUN/CREAT BLD: 15 (ref 9–20)
CALCIUM SERPL-MCNC: 9.7 MG/DL (ref 8.6–10.4)
CHLORIDE BLD-SCNC: 102 MMOL/L (ref 98–107)
CO2: 24 MMOL/L (ref 20–31)
CREAT SERPL-MCNC: 1.09 MG/DL (ref 0.5–0.9)
EOSINOPHILS RELATIVE PERCENT: 0 % (ref 1–4)
GFR AFRICAN AMERICAN: >60 ML/MIN
GFR NON-AFRICAN AMERICAN: 53 ML/MIN
GFR SERPL CREATININE-BSD FRML MDRD: ABNORMAL ML/MIN/{1.73_M2}
GFR SERPL CREATININE-BSD FRML MDRD: ABNORMAL ML/MIN/{1.73_M2}
GLUCOSE BLD-MCNC: 147 MG/DL (ref 70–99)
HCT VFR BLD CALC: 41.9 % (ref 36.3–47.1)
HEMOGLOBIN: 13.9 G/DL (ref 11.9–15.1)
IMMATURE GRANULOCYTES: 1 %
LYMPHOCYTES # BLD: 7 % (ref 24–43)
MCH RBC QN AUTO: 31.3 PG (ref 25.2–33.5)
MCHC RBC AUTO-ENTMCNC: 33.2 G/DL (ref 28.4–34.8)
MCV RBC AUTO: 94.4 FL (ref 82.6–102.9)
MONOCYTES # BLD: 2 % (ref 3–12)
NRBC AUTOMATED: 0 PER 100 WBC
PDW BLD-RTO: 12.6 % (ref 11.8–14.4)
PLATELET # BLD: 373 K/UL (ref 138–453)
PMV BLD AUTO: 9.5 FL (ref 8.1–13.5)
POTASSIUM SERPL-SCNC: 4.6 MMOL/L (ref 3.7–5.3)
RBC # BLD: 4.44 M/UL (ref 3.95–5.11)
SEG NEUTROPHILS: 89 % (ref 36–65)
SEGMENTED NEUTROPHILS ABSOLUTE COUNT: 14.69 K/UL (ref 1.5–8.1)
SODIUM BLD-SCNC: 137 MMOL/L (ref 135–144)
WBC # BLD: 16.5 K/UL (ref 3.5–11.3)

## 2022-08-23 PROCEDURE — 6360000002 HC RX W HCPCS: Performed by: NURSE ANESTHETIST, CERTIFIED REGISTERED

## 2022-08-23 PROCEDURE — 80048 BASIC METABOLIC PNL TOTAL CA: CPT

## 2022-08-23 PROCEDURE — C1769 GUIDE WIRE: HCPCS | Performed by: UROLOGY

## 2022-08-23 PROCEDURE — 2580000003 HC RX 258: Performed by: EMERGENCY MEDICINE

## 2022-08-23 PROCEDURE — 3700000001 HC ADD 15 MINUTES (ANESTHESIA): Performed by: UROLOGY

## 2022-08-23 PROCEDURE — 2580000003 HC RX 258: Performed by: NURSE ANESTHETIST, CERTIFIED REGISTERED

## 2022-08-23 PROCEDURE — C2617 STENT, NON-COR, TEM W/O DEL: HCPCS | Performed by: UROLOGY

## 2022-08-23 PROCEDURE — 6370000000 HC RX 637 (ALT 250 FOR IP): Performed by: UROLOGY

## 2022-08-23 PROCEDURE — 36415 COLL VENOUS BLD VENIPUNCTURE: CPT

## 2022-08-23 PROCEDURE — 3600000004 HC SURGERY LEVEL 4 BASE: Performed by: UROLOGY

## 2022-08-23 PROCEDURE — 6370000000 HC RX 637 (ALT 250 FOR IP): Performed by: EMERGENCY MEDICINE

## 2022-08-23 PROCEDURE — 3700000000 HC ANESTHESIA ATTENDED CARE: Performed by: UROLOGY

## 2022-08-23 PROCEDURE — 3209999900 FLUORO FOR SURGICAL PROCEDURES

## 2022-08-23 PROCEDURE — 3600000014 HC SURGERY LEVEL 4 ADDTL 15MIN: Performed by: UROLOGY

## 2022-08-23 PROCEDURE — 6360000002 HC RX W HCPCS: Performed by: EMERGENCY MEDICINE

## 2022-08-23 PROCEDURE — 2709999900 HC NON-CHARGEABLE SUPPLY: Performed by: UROLOGY

## 2022-08-23 PROCEDURE — 2500000003 HC RX 250 WO HCPCS: Performed by: NURSE ANESTHETIST, CERTIFIED REGISTERED

## 2022-08-23 PROCEDURE — 6360000002 HC RX W HCPCS: Performed by: UROLOGY

## 2022-08-23 PROCEDURE — 85025 COMPLETE CBC W/AUTO DIFF WBC: CPT

## 2022-08-23 PROCEDURE — 7100000011 HC PHASE II RECOVERY - ADDTL 15 MIN: Performed by: UROLOGY

## 2022-08-23 PROCEDURE — 2720000010 HC SURG SUPPLY STERILE: Performed by: UROLOGY

## 2022-08-23 PROCEDURE — 82365 CALCULUS SPECTROSCOPY: CPT

## 2022-08-23 PROCEDURE — 7100000010 HC PHASE II RECOVERY - FIRST 15 MIN: Performed by: UROLOGY

## 2022-08-23 PROCEDURE — 6370000000 HC RX 637 (ALT 250 FOR IP): Performed by: NURSE ANESTHETIST, CERTIFIED REGISTERED

## 2022-08-23 DEVICE — URETERAL STENT
Type: IMPLANTABLE DEVICE | Status: FUNCTIONAL
Brand: PERCUFLEX™ PLUS

## 2022-08-23 RX ORDER — DIMENHYDRINATE 50 MG/1
50 TABLET ORAL ONCE
Status: COMPLETED | OUTPATIENT
Start: 2022-08-23 | End: 2022-08-23

## 2022-08-23 RX ORDER — FENTANYL CITRATE 50 UG/ML
INJECTION, SOLUTION INTRAMUSCULAR; INTRAVENOUS PRN
Status: DISCONTINUED | OUTPATIENT
Start: 2022-08-23 | End: 2022-08-23 | Stop reason: SDUPTHER

## 2022-08-23 RX ORDER — 0.9 % SODIUM CHLORIDE 0.9 %
1000 INTRAVENOUS SOLUTION INTRAVENOUS ONCE
Status: COMPLETED | OUTPATIENT
Start: 2022-08-23 | End: 2022-08-23

## 2022-08-23 RX ORDER — PROPOFOL 10 MG/ML
INJECTION, EMULSION INTRAVENOUS PRN
Status: DISCONTINUED | OUTPATIENT
Start: 2022-08-23 | End: 2022-08-23 | Stop reason: SDUPTHER

## 2022-08-23 RX ORDER — KETOROLAC TROMETHAMINE 30 MG/ML
INJECTION, SOLUTION INTRAMUSCULAR; INTRAVENOUS PRN
Status: DISCONTINUED | OUTPATIENT
Start: 2022-08-23 | End: 2022-08-23 | Stop reason: SDUPTHER

## 2022-08-23 RX ORDER — MORPHINE SULFATE 2 MG/ML
2 INJECTION, SOLUTION INTRAMUSCULAR; INTRAVENOUS ONCE
Status: COMPLETED | OUTPATIENT
Start: 2022-08-23 | End: 2022-08-23

## 2022-08-23 RX ORDER — LIDOCAINE HYDROCHLORIDE 20 MG/ML
INJECTION, SOLUTION EPIDURAL; INFILTRATION; INTRACAUDAL; PERINEURAL PRN
Status: DISCONTINUED | OUTPATIENT
Start: 2022-08-23 | End: 2022-08-23 | Stop reason: SDUPTHER

## 2022-08-23 RX ORDER — FENTANYL CITRATE 50 UG/ML
50 INJECTION, SOLUTION INTRAMUSCULAR; INTRAVENOUS EVERY 5 MIN PRN
Status: DISCONTINUED | OUTPATIENT
Start: 2022-08-23 | End: 2022-08-23 | Stop reason: HOSPADM

## 2022-08-23 RX ORDER — DEXAMETHASONE SODIUM PHOSPHATE 4 MG/ML
INJECTION, SOLUTION INTRA-ARTICULAR; INTRALESIONAL; INTRAMUSCULAR; INTRAVENOUS; SOFT TISSUE PRN
Status: DISCONTINUED | OUTPATIENT
Start: 2022-08-23 | End: 2022-08-23 | Stop reason: SDUPTHER

## 2022-08-23 RX ORDER — ONDANSETRON 2 MG/ML
4 INJECTION INTRAMUSCULAR; INTRAVENOUS ONCE
Status: COMPLETED | OUTPATIENT
Start: 2022-08-23 | End: 2022-08-23

## 2022-08-23 RX ORDER — HYDROCODONE BITARTRATE AND ACETAMINOPHEN 5; 325 MG/1; MG/1
1 TABLET ORAL ONCE
Status: COMPLETED | OUTPATIENT
Start: 2022-08-23 | End: 2022-08-23

## 2022-08-23 RX ORDER — SODIUM CHLORIDE, SODIUM LACTATE, POTASSIUM CHLORIDE, CALCIUM CHLORIDE 600; 310; 30; 20 MG/100ML; MG/100ML; MG/100ML; MG/100ML
INJECTION, SOLUTION INTRAVENOUS CONTINUOUS
Status: DISCONTINUED | OUTPATIENT
Start: 2022-08-23 | End: 2022-08-23 | Stop reason: HOSPADM

## 2022-08-23 RX ORDER — ONDANSETRON 2 MG/ML
4 INJECTION INTRAMUSCULAR; INTRAVENOUS
Status: DISCONTINUED | OUTPATIENT
Start: 2022-08-23 | End: 2022-08-23 | Stop reason: HOSPADM

## 2022-08-23 RX ORDER — ACETAMINOPHEN 325 MG/1
650 TABLET ORAL ONCE
Status: COMPLETED | OUTPATIENT
Start: 2022-08-23 | End: 2022-08-23

## 2022-08-23 RX ORDER — ONDANSETRON 2 MG/ML
INJECTION INTRAMUSCULAR; INTRAVENOUS PRN
Status: DISCONTINUED | OUTPATIENT
Start: 2022-08-23 | End: 2022-08-23 | Stop reason: SDUPTHER

## 2022-08-23 RX ORDER — FENTANYL CITRATE 50 UG/ML
25 INJECTION, SOLUTION INTRAMUSCULAR; INTRAVENOUS EVERY 5 MIN PRN
Status: DISCONTINUED | OUTPATIENT
Start: 2022-08-23 | End: 2022-08-23 | Stop reason: HOSPADM

## 2022-08-23 RX ORDER — OXYCODONE HYDROCHLORIDE 5 MG/1
5 TABLET ORAL
Status: DISCONTINUED | OUTPATIENT
Start: 2022-08-23 | End: 2022-08-23 | Stop reason: HOSPADM

## 2022-08-23 RX ORDER — CIPROFLOXACIN 2 MG/ML
400 INJECTION, SOLUTION INTRAVENOUS
Status: COMPLETED | OUTPATIENT
Start: 2022-08-23 | End: 2022-08-23

## 2022-08-23 RX ORDER — LIDOCAINE HYDROCHLORIDE 20 MG/ML
JELLY TOPICAL PRN
Status: DISCONTINUED | OUTPATIENT
Start: 2022-08-23 | End: 2022-08-23 | Stop reason: ALTCHOICE

## 2022-08-23 RX ADMIN — HYDROCODONE BITARTRATE AND ACETAMINOPHEN 1 TABLET: 5; 325 TABLET ORAL at 22:32

## 2022-08-23 RX ADMIN — FENTANYL CITRATE 50 MCG: 50 INJECTION INTRAMUSCULAR; INTRAVENOUS at 12:29

## 2022-08-23 RX ADMIN — DIMENHYDRINATE 50 MG: 50 TABLET ORAL at 10:34

## 2022-08-23 RX ADMIN — ACETAMINOPHEN 650 MG: 325 TABLET ORAL at 10:35

## 2022-08-23 RX ADMIN — ONDANSETRON 4 MG: 2 INJECTION INTRAMUSCULAR; INTRAVENOUS at 21:30

## 2022-08-23 RX ADMIN — SODIUM CHLORIDE, POTASSIUM CHLORIDE, SODIUM LACTATE AND CALCIUM CHLORIDE: 600; 310; 30; 20 INJECTION, SOLUTION INTRAVENOUS at 10:36

## 2022-08-23 RX ADMIN — KETOROLAC TROMETHAMINE 30 MG: 30 INJECTION, SOLUTION INTRAMUSCULAR at 12:34

## 2022-08-23 RX ADMIN — FENTANYL CITRATE 50 MCG: 50 INJECTION INTRAMUSCULAR; INTRAVENOUS at 12:01

## 2022-08-23 RX ADMIN — MORPHINE SULFATE 2 MG: 2 INJECTION, SOLUTION INTRAMUSCULAR; INTRAVENOUS at 21:31

## 2022-08-23 RX ADMIN — LIDOCAINE HYDROCHLORIDE 100 MG: 20 INJECTION, SOLUTION EPIDURAL; INFILTRATION; INTRACAUDAL; PERINEURAL at 12:01

## 2022-08-23 RX ADMIN — DEXAMETHASONE SODIUM PHOSPHATE 4 MG: 4 INJECTION, SOLUTION INTRAMUSCULAR; INTRAVENOUS at 12:07

## 2022-08-23 RX ADMIN — CIPROFLOXACIN 400 MG: 2 INJECTION, SOLUTION INTRAVENOUS at 11:51

## 2022-08-23 RX ADMIN — SODIUM CHLORIDE 1000 ML: 9 INJECTION, SOLUTION INTRAVENOUS at 21:32

## 2022-08-23 RX ADMIN — PROPOFOL 200 MG: 10 INJECTION, EMULSION INTRAVENOUS at 12:01

## 2022-08-23 RX ADMIN — ONDANSETRON 4 MG: 2 INJECTION INTRAMUSCULAR; INTRAVENOUS at 12:07

## 2022-08-23 ASSESSMENT — PAIN - FUNCTIONAL ASSESSMENT
PAIN_FUNCTIONAL_ASSESSMENT: NONE - DENIES PAIN
PAIN_FUNCTIONAL_ASSESSMENT: 0-10

## 2022-08-23 ASSESSMENT — PAIN SCALES - GENERAL
PAINLEVEL_OUTOF10: 6
PAINLEVEL_OUTOF10: 6
PAINLEVEL_OUTOF10: 10

## 2022-08-23 ASSESSMENT — PAIN DESCRIPTION - PAIN TYPE
TYPE: ACUTE PAIN
TYPE: ACUTE PAIN

## 2022-08-23 ASSESSMENT — PAIN DESCRIPTION - LOCATION
LOCATION: ABDOMEN;FLANK
LOCATION: ABDOMEN;FLANK

## 2022-08-23 ASSESSMENT — PAIN DESCRIPTION - FREQUENCY: FREQUENCY: CONTINUOUS

## 2022-08-23 ASSESSMENT — PAIN DESCRIPTION - ORIENTATION
ORIENTATION: LEFT
ORIENTATION: LEFT

## 2022-08-23 ASSESSMENT — PAIN DESCRIPTION - DESCRIPTORS: DESCRIPTORS: STABBING;SHARP

## 2022-08-23 ASSESSMENT — LIFESTYLE VARIABLES: SMOKING_STATUS: 1

## 2022-08-23 NOTE — DISCHARGE INSTRUCTIONS
SAME DAY SURGERY DISCHARGE INSTRUCTIONS    1. Do not drive or operate hazardous machinery for 24 hours. 2.  Do not make important personal or business decisions for 24 hours. 3.  Do not drink alcoholic beverages for 24 hours. 4.  Do not smoke tobacco products for 24 hours. 5.  Eat light foods (Jell-O, soups, etc....) and drink plenty of fluids (water, Sprite, etc...) up to 8 glasses per day, as you can tolerate. 6.  Limit your activities for 24 hours. Do not engage in heavy work until your surgeon gives you permission. 7.  Call your surgeon for any questions regarding your surgery. CYSTOSCOPY DISCHARGE INSTRUCTIONS    Possible burning during urination and/or blood tinged urine. Drink 6-8 glasses of water for the next day or so. (This helps to flush the urinary tract.)    Call Dr. Tanner Aceves ([phone]) if you develop:    Fever over 100 degrees  Prolonged soreness/pain  Unusual bleeding/bruising  Unable to urinate or if urine is bloody  You cannot pass urine 8 hours after the test.  You have pain in your belly or your back just below your rib cage. (This is called flank pain.)  You have frequent urge to urinate but can pass only small amounts of urine. Call Dr. Tanner Aceves office for follow-up appointment ([phone]).

## 2022-08-23 NOTE — H&P
History and Physical    Patient:  Kailash Salter  MRN: [id number]    CHIEF COMPLAINT:  left flank pain    HISTORY OF PRESENT ILLNESS:   The patient is a 50 y.o. female who presents with left flank pain. [facility]  8/5/2022 cystoscopy left stent placement        Currently  Patient is here today for follow-up status post left ureteral stent placement. Patient is scheduled for left-sided holmium laser lithotripsy in approximately 1 week. Patient did have prolonged leukocytosis. Patient's most recent white blood cell count was 13.3. Patient does have a proximal 7 mm left ureteral stone. Patient has finished her antibiotics. She is reporting some itching and discomfort in the vaginal area. Patient did report this to her primary care physician who did nothing for it. Patient is tolerating her stent well. No current pain. st Medical History:    Past Medical History:   Diagnosis Date    Hyperlipidemia     Hypertension        Past Surgical History:    Past Surgical History:   Procedure Laterality Date    BLADDER SURGERY      CYSTOSCOPY Left 08/05/2022    Dr. Arbil Casanova - with stent placement    CYSTOSCOPY Left 8/5/2022    CYSTOSCOPY URETERAL STENT INSERTION performed by Santiago Galeano MD at [address] [place] [address] 08/05/2022    with Dr. Abril Casanova       Medications Prior to Admission:    Prior to Admission medications    Medication Sig Start Date End Date Taking? Authorizing Provider   montelukast (SINGULAIR) 10 MG tablet take 1 tablet by mouth once daily 8/10/22   Historical Provider, MD   pantoprazole (PROTONIX) 40 MG tablet take 1 tablet by mouth twice a day 6/13/22   Historical Provider, MD   fluconazole (DIFLUCAN) 100 MG tablet Take 1 tablet by mouth daily for 7 days 8/19/22 8/26/22  Santiago Galeano MD   lisinopril (PRINIVIL;ZESTRIL) 10 MG tablet Take 10 mg by mouth in the morning.     Historical Provider, MD   atorvastatin (LIPITOR) 40 MG tablet Take 40 mg by mouth in the morning. Historical Provider, MD       Allergies:  Sulfa antibiotics    Social History:    Social History     Socioeconomic History    Marital status: Single     Spouse name: Not on file    Number of children: Not on file    Years of education: Not on file    Highest education level: Not on file   Occupational History    Not on file   Tobacco Use    Smoking status: Every Day     Packs/day: 0.50     Types: Cigarettes    Smokeless tobacco: Never   Substance and Sexual Activity    Alcohol use: Not on file    Drug use: Not on file    Sexual activity: Not on file   Other Topics Concern    Not on file   Social History Narrative    Not on file     Social Determinants of Health     Financial Resource Strain: Not on file   Food Insecurity: Not on file   Transportation Needs: Not on file   Physical Activity: Not on file   Stress: Not on file   Social Connections: Not on file   Intimate Partner Violence: Not on file   Housing Stability: Not on file       Family History:  History reviewed. No pertinent family history. REVIEW OF SYSTEMS:  All systems reviewed and negative except for that already noted in the HPI. Physical Exam:      This a 50 y.o. female   No data found. Constitutional: Patient in no acute distress. Neuro: Alert and oriented to person, place and time. Psych: mood and affect normal  HEENT negative  Lungs: Respiratory effort is normal  Cardiovascular: Normal peripheral pulses  Abdomen: Soft, non-tender, non-distended with no CVA, flank pain or hepatosplenomegaly. No hernias. Kidneys normal.  Lymphatics: No palpable lymphadenopathy. Bladder non-tender and not distended. Pelvic exam:  External genitalia normal  Urethral and urethral meatus normal  Vagina normal with no evidence of pelvic prolapse  Uterus normal  Adnexa normal  Anus and perineum normal  Rectal exam not indicated    LABS:   No results for input(s): WBC, HGB, HCT, MCV, PLT in the last 72 hours.   No results for input(s): NA, K, CL, CO2, PHOS, BUN, CREATININE, CA in the last 72 hours. Additional Lab/culture results:    Urinalysis: No results for input(s): COLORU, PHUR, LABCAST, WBCUA, RBCUA, MUCUS, TRICHOMONAS, YEAST, BACTERIA, CLARITYU, SPECGRAV, LEUKOCYTESUR, UROBILINOGEN, BILIRUBINUR, BLOODU in the last 72 hours.     Invalid input(s): NITRATE, GLUCOSEUKETONESUAMORPHOUS     -----------------------------------------------------------------  Imaging Results:      Assessment and Plan   Impression:    Patient Active Problem List   Diagnosis    Ureteral calculus    Hydronephrosis    Leukocytosis       Plan: Left Clemente Richey MD  8:57 PM 8/22/2022

## 2022-08-23 NOTE — ANESTHESIA POSTPROCEDURE EVALUATION
Department of Anesthesiology  Postprocedure Note    Patient: Baltazar Robledo  MRN: [id number]  YOB: 1975  Date of evaluation: 8/23/2022      Procedure Summary     Date: 08/23/22 Room / Location: [facility]    Anesthesia Start: 5799 Anesthesia Stop: 9360    Procedures:       CYSTOSCOPY URETEROSCOPY LASER-HLL (Left)      CYSTOSCOPY URETERAL STENT Mikal Cruz (Left) Diagnosis:       Urethra, calculus      (LEFT URETHERAL CALCULUS)    Surgeons: Layla Contreras MD Responsible Provider: TIM Castillo CRNA    Anesthesia Type: general ASA Status: 2          Anesthesia Type: No value filed.     Shannon Phase I:      Shannon Phase II: Shannon Score: 10      Anesthesia Post Evaluation    Patient location during evaluation: PACU  Patient participation: complete - patient participated  Level of consciousness: awake and alert  Airway patency: patent  Nausea & Vomiting: no nausea and no vomiting  Complications: no  Cardiovascular status: blood pressure returned to baseline  Respiratory status: acceptable  Hydration status: euvolemic

## 2022-08-23 NOTE — ANESTHESIA PRE PROCEDURE
Department of Anesthesiology  Preprocedure Note       Name:  Evelia Hernandez   Age:  50 y.o.  :  1975                                          MRN:  [id number]         Date:  2022      Surgeon: Sakshi Dennis):  Madelyn Lan MD    Procedure: Procedure(s):  CYSTOSCOPY URETEROSCOPY LASER-HLL  CYSTOSCOPY URETERAL STENT INSERTION-EXCHANGE    Medications prior to admission:   Prior to Admission medications    Medication Sig Start Date End Date Taking? Authorizing Provider   montelukast (SINGULAIR) 10 MG tablet take 1 tablet by mouth once daily 8/10/22   Historical Provider, MD   pantoprazole (PROTONIX) 40 MG tablet take 1 tablet by mouth twice a day 22   Historical Provider, MD   fluconazole (DIFLUCAN) 100 MG tablet Take 1 tablet by mouth daily for 7 days 22  Madelyn Lan MD   lisinopril (PRINIVIL;ZESTRIL) 10 MG tablet Take 10 mg by mouth in the morning. Historical Provider, MD   atorvastatin (LIPITOR) 40 MG tablet Take 40 mg by mouth in the morning. Historical Provider, MD       Current medications:    No current facility-administered medications for this visit. No current outpatient medications on file. Facility-Administered Medications Ordered in Other Visits   Medication Dose Route Frequency Provider Last Rate Last Admin    lactated ringers infusion   IntraVENous Continuous TIM Lake - CRNA 100 mL/hr at 22 1036 New Bag at 22 1036    ciprofloxacin (CIPRO) IVPB 400 mg  400 mg IntraVENous On Call to Cortney Lowry MD           Allergies:     Allergies   Allergen Reactions    Sulfa Antibiotics        Problem List:    Patient Active Problem List   Diagnosis Code    Ureteral calculus N20.1    Hydronephrosis N13.30    Leukocytosis D72.829       Past Medical History:        Diagnosis Date    Hyperlipidemia     Hypertension        Past Surgical History:        Procedure Laterality Date    BLADDER SURGERY      CYSTOSCOPY Left 2022     Gayatri Chahal - with stent placement    CYSTOSCOPY Left 8/5/2022    CYSTOSCOPY URETERAL STENT INSERTION performed by Nico Desouza MD at [address] [facility] Left 08/05/2022    with Dr. Gayatri Chahal       Social History:    Social History     Tobacco Use    Smoking status: Every Day     Packs/day: 0.50     Types: Cigarettes    Smokeless tobacco: Never   Substance Use Topics    Alcohol use: Not Currently                                Ready to quit: Not Answered  Counseling given: Not Answered      Vital Signs (Current): There were no vitals filed for this visit. BP Readings from Last 3 Encounters:   08/23/22 (!) 159/97   08/19/22 (!) 144/80   08/08/22 (!) 163/86       NPO Status:                                                                                 BMI:   Wt Readings from Last 3 Encounters:   08/23/22 241 lb 6.4 oz (109.5 kg)   08/19/22 244 lb (110.7 kg)   08/06/22 249 lb 1.6 oz (113 kg)     There is no height or weight on file to calculate BMI.    CBC:   Lab Results   Component Value Date/Time    WBC 13.3 08/17/2022 02:24 PM    RBC 4.17 08/17/2022 02:24 PM    HGB 13.3 08/17/2022 02:24 PM    HCT 40.1 08/17/2022 02:24 PM    MCV 96.2 08/17/2022 02:24 PM    RDW 12.7 08/17/2022 02:24 PM     08/17/2022 02:24 PM       CMP:   Lab Results   Component Value Date/Time     08/08/2022 11:51 PM    K 3.7 08/08/2022 11:51 PM     08/08/2022 11:51 PM    CO2 24 08/08/2022 11:51 PM    BUN 15 08/08/2022 11:51 PM    CREATININE 0.80 08/08/2022 11:51 PM    GFRAA >60 08/08/2022 11:51 PM    LABGLOM >60 08/08/2022 11:51 PM    GLUCOSE 118 08/08/2022 11:51 PM    CALCIUM 9.4 08/08/2022 11:51 PM       POC Tests: No results for input(s): POCGLU, POCNA, POCK, POCCL, POCBUN, POCHEMO, POCHCT in the last 72 hours.     Coags: No results found for: PROTIME, INR, APTT    HCG (If Applicable):   Lab Results   Component Value Date    PREGTESTUR NEGATIVE 08/05/2022        ABGs: No results found for: PHART, PO2ART, OOJ0TXI, FJE2ROZ, BEART, X7CQBXNC     Type & Screen (If Applicable):  No results found for: LABABO, LABRH    Drug/Infectious Status (If Applicable):  No results found for: HIV, HEPCAB    COVID-19 Screening (If Applicable): No results found for: COVID19        Anesthesia Evaluation  Patient summary reviewed and Nursing notes reviewed no history of anesthetic complications:   Airway: Mallampati: I  TM distance: >3 FB   Neck ROM: full  Mouth opening: > = 3 FB   Dental: normal exam         Pulmonary:normal exam  breath sounds clear to auscultation  (+) current smoker          Patient smoked on day of surgery. Cardiovascular:  Exercise tolerance: good (>4 METS),   (+) hypertension:, hyperlipidemia        Rhythm: regular  Rate: normal           Beta Blocker:  Not on Beta Blocker         Neuro/Psych:   Negative Neuro/Psych ROS              GI/Hepatic/Renal:   (+) GERD: well controlled, morbid obesity          Endo/Other: Negative Endo/Other ROS                    Abdominal:   (+) obese,           Vascular: negative vascular ROS. Other Findings:             Anesthesia Plan      general     ASA 2       Induction: intravenous. MIPS: Postoperative opioids intended and Prophylactic antiemetics administered. Anesthetic plan and risks discussed with patient. Plan discussed with CRNA.                     Cheryle Banks, APRN - [facility]   8/23/2022

## 2022-08-23 NOTE — PROGRESS NOTES

## 2022-08-23 NOTE — PROGRESS NOTES
1412-Patient called phase 2 recovery back at this time. States \"I think I need to come back in, I can not stop peeing. \" Cj Freeman takes patient's phone number and states this writer will call Dr. Callie Howard and call patient right back. 1413-Writer called Dr. Callie Howard with patient information. Dr. Callie Howard states \"patient needs to pull out the stent all the way. It is too far out to put back in. \"Dr. Callie Howard aware of patient accidentally pulling stent. 1415-Patient called back and writer relayed message to patient. Patient verbalizes understanding. Patient aware if any other issues or pain to call Dr. Daryl Pardo office.

## 2022-08-23 NOTE — PROGRESS NOTES
This writer received phone call from patient. Patient states Bruno Choi is this string hanging out in between my legs, I tried pulling on it. \" Writer explained that was the stent that Dr. Kayleen Lobato inserted that she is to have removed on Friday at her follow up appointment. Patient denies any pain. Instructed patient to re tape stent string to leg and if any pain-contact Dr. Isabelle Kraft office. Patient verbalized understanding and denied any other questions.

## 2022-08-23 NOTE — PROGRESS NOTES
Tina Solon called and made aware of patient's blood pressure. Anesthesia OK with discharge at this time.

## 2022-08-24 ENCOUNTER — OFFICE VISIT (OUTPATIENT)
Dept: UROLOGY | Age: 47
End: 2022-08-24
Payer: COMMERCIAL

## 2022-08-24 ENCOUNTER — HOSPITAL ENCOUNTER (OUTPATIENT)
Age: 47
Setting detail: SPECIMEN
Discharge: HOME | End: 2022-08-24
Payer: COMMERCIAL

## 2022-08-24 VITALS
TEMPERATURE: 98 F | HEIGHT: 67 IN | OXYGEN SATURATION: 98 % | WEIGHT: 243 LBS | HEART RATE: 82 BPM | SYSTOLIC BLOOD PRESSURE: 176 MMHG | DIASTOLIC BLOOD PRESSURE: 101 MMHG | BODY MASS INDEX: 38.14 KG/M2

## 2022-08-24 DIAGNOSIS — R10.9 FLANK PAIN: ICD-10-CM

## 2022-08-24 DIAGNOSIS — G89.18 POST-OP PAIN: ICD-10-CM

## 2022-08-24 DIAGNOSIS — N20.1 URETERAL CALCULUS: Primary | ICD-10-CM

## 2022-08-24 PROCEDURE — G8427 DOCREV CUR MEDS BY ELIG CLIN: HCPCS | Performed by: PHYSICIAN ASSISTANT

## 2022-08-24 PROCEDURE — 4004F PT TOBACCO SCREEN RCVD TLK: CPT | Performed by: PHYSICIAN ASSISTANT

## 2022-08-24 PROCEDURE — 99214 OFFICE O/P EST MOD 30 MIN: CPT | Performed by: PHYSICIAN ASSISTANT

## 2022-08-24 PROCEDURE — 87086 URINE CULTURE/COLONY COUNT: CPT

## 2022-08-24 PROCEDURE — G8417 CALC BMI ABV UP PARAM F/U: HCPCS | Performed by: PHYSICIAN ASSISTANT

## 2022-08-24 RX ORDER — HYDROCODONE BITARTRATE AND ACETAMINOPHEN 5; 325 MG/1; MG/1
1 TABLET ORAL EVERY 6 HOURS PRN
Qty: 12 TABLET | Refills: 0 | Status: SHIPPED | OUTPATIENT
Start: 2022-08-24 | End: 2022-08-27

## 2022-08-24 RX ORDER — CIPROFLOXACIN 500 MG/1
500 TABLET, FILM COATED ORAL 2 TIMES DAILY
Qty: 14 TABLET | Refills: 0 | Status: SHIPPED | OUTPATIENT
Start: 2022-08-24 | End: 2022-08-29

## 2022-08-24 RX ORDER — ALFUZOSIN HYDROCHLORIDE 10 MG/1
10 TABLET, EXTENDED RELEASE ORAL DAILY
Qty: 30 TABLET | Refills: 0 | Status: SHIPPED | OUTPATIENT
Start: 2022-08-24 | End: 2022-08-29

## 2022-08-24 ASSESSMENT — ENCOUNTER SYMPTOMS
COLOR CHANGE: 0
EYE REDNESS: 0
WHEEZING: 0
NAUSEA: 0
ABDOMINAL PAIN: 0
SHORTNESS OF BREATH: 0
VOMITING: 0
BACK PAIN: 0
CONSTIPATION: 0
APNEA: 0
COUGH: 0

## 2022-08-24 NOTE — DISCHARGE INSTRUCTIONS
Please follow-up with the urologist in the morning. If symptoms worsen or other concerns please return to emergency department.

## 2022-08-24 NOTE — PROGRESS NOTES
HPI:    Patient is a 50 y.o. female in no acute distress. She is alert and oriented to person, place, and time. History  8/5/2022 cystoscopy left stent placement    8/23/2022 -left HL L with stent exchange    Today:  Patient is here today status post left HLL with stent exchange. Patient did have surgery yesterday. On arrival home she did accidentally pull her stent which we did want to keep an for approximately a week. She developed significant left flank pain and went to the emergency room. Patient's white blood cell count has been elevated since beginning of the month. She is still uncomfortable. Past Medical History:   Diagnosis Date    Hyperlipidemia     Hypertension      Past Surgical History:   Procedure Laterality Date    BLADDER SURGERY      CYSTOSCOPY Left 08/05/2022    Dr. Earl Villanueva - with stent placement    CYSTOSCOPY Left 8/5/2022    CYSTOSCOPY URETERAL STENT INSERTION performed by Mary Schneider MD at [facility] 8/23/2022    CYSTOSCOPY URETEROSCOPY LASER-HLL performed by Mary Schneider MD at [facility] 8/23/2022    CYSTOSCOPY URETERAL STENT [address] performed by Mary Schneider MD at [address] [place] [address] Left 08/05/2022    with Dr. Earl Villanueva     Outpatient Encounter Medications as of 8/24/2022   Medication Sig Dispense Refill    alfuzosin (UROXATRAL) 10 MG extended release tablet Take 1 tablet by mouth daily 30 tablet 0    ciprofloxacin (CIPRO) 500 MG tablet Take 1 tablet by mouth 2 times daily for 7 days 14 tablet 0    HYDROcodone-acetaminophen (NORCO) 5-325 MG per tablet Take 1 tablet by mouth every 6 hours as needed for Pain for up to 3 days. Intended supply: 3 days.  Take lowest dose possible to manage pain 12 tablet 0    montelukast (SINGULAIR) 10 MG tablet take 1 tablet by mouth once daily      pantoprazole (PROTONIX) 40 MG tablet take 1 tablet by mouth twice a day      fluconazole (DIFLUCAN) 100 MG tablet Take 1 tablet by mouth daily for 7 days 7 tablet 0    lisinopril (PRINIVIL;ZESTRIL) 10 MG tablet Take 10 mg by mouth in the morning. atorvastatin (LIPITOR) 40 MG tablet Take 40 mg by mouth in the morning. No facility-administered encounter medications on file as of 8/24/2022. Current Outpatient Medications on File Prior to Visit   Medication Sig Dispense Refill    montelukast (SINGULAIR) 10 MG tablet take 1 tablet by mouth once daily      pantoprazole (PROTONIX) 40 MG tablet take 1 tablet by mouth twice a day      fluconazole (DIFLUCAN) 100 MG tablet Take 1 tablet by mouth daily for 7 days 7 tablet 0    lisinopril (PRINIVIL;ZESTRIL) 10 MG tablet Take 10 mg by mouth in the morning. atorvastatin (LIPITOR) 40 MG tablet Take 40 mg by mouth in the morning. No current facility-administered medications on file prior to visit. Sulfa antibiotics  No family history on file. Social History     Tobacco Use   Smoking Status Every Day    Packs/day: 0.50    Types: Cigarettes   Smokeless Tobacco Never       Social History     Substance and Sexual Activity   Alcohol Use Not Currently       Review of Systems   Constitutional:  Negative for appetite change, chills and fever. Eyes:  Negative for redness and visual disturbance. Respiratory:  Negative for apnea, cough, shortness of breath and wheezing. Cardiovascular:  Negative for chest pain and leg swelling. Gastrointestinal:  Negative for abdominal pain, constipation, nausea and vomiting. Genitourinary:  Positive for flank pain. Negative for difficulty urinating, dyspareunia, dysuria, enuresis, frequency, hematuria, pelvic pain, urgency, vaginal bleeding and vaginal discharge. Musculoskeletal:  Negative for back pain, joint swelling and myalgias. Skin:  Negative for color change, rash and wound. Neurological:  Negative for dizziness, tremors and numbness. Hematological:  Negative for adenopathy. Does not bruise/bleed easily. Psychiatric/Behavioral:  Negative for sleep disturbance. BP (!) 176/101   Pulse 82   Temp 98 °F (36.7 °C) (Infrared)   Ht 5' 7\" (1.702 m)   Wt 243 lb (110.2 kg)   SpO2 98%   BMI 38.06 kg/m²       PHYSICAL EXAM:  Constitutional: Patient resting comfortably, in no acute distress. Neuro: Alert and oriented to person place and time. Psych: Mood and affect normal.  HEENT: normocephalic, atraumatic  Lungs: Respiratory effort normal, unlabored  Abdomen: Soft, non-tender, non-distended  : left CVA tenderness. Pelvic: deferred     Lab Results   Component Value Date    BUN 16 08/23/2022     Lab Results   Component Value Date    CREATININE 1.09 (H) 08/23/2022       ASSESSMENT:   Diagnosis Orders   1. Ureteral calculus  HYDROcodone-acetaminophen (NORCO) 5-325 MG per tablet      2. Flank pain  Culture, Urine    HYDROcodone-acetaminophen (NORCO) 5-325 MG per tablet      3. Post-op pain  HYDROcodone-acetaminophen (NORCO) 5-325 MG per tablet              PLAN:  Will check urine culture, will call with results    Start empiric Cipro    We did give patient options of returning to the OR for stent placement tomorrow versus continuing conservative management. Patient has opted conservative management.     Uroxatrol    Avoid caffeine and alcohol    Encourage 80 ounces of fluids    Norco as needed for pain    MiraLAX for constipation    Follow-up early next week for reassessment

## 2022-08-24 NOTE — ED PROVIDER NOTES
[address] [place] [address] ED  EMERGENCY DEPARTMENT ENCOUNTER      Pt Name: Nikkie Tan  MRN: [id number]  [place] 1975  Date of evaluation: 8/23/2022  Provider: Leilani Sandifer, MD    CHIEF COMPLAINT       Chief Complaint   Patient presents with    Post-op Problem     Pt had litho done today by Dr. Nahomy Garland, states increased sharp pain on left flank pain radiating into back. Pt stated stent was placed today but already fell out. HISTORY OF PRESENT ILLNESS   (Location/Symptom, Timing/Onset, Context/Setting, Quality, Duration, Modifying Factors, Severity)  Note limiting factors. Nikkie Tan is a 50 y.o. female who presents to the emergency department concern for left-sided flank pain postop day 0 from lithotripsy. States she pulled out her stent. States she tried oral Toradol prior to arrival.  States she did talk to the urologist.  Denies any fevers denies any urinary symptoms or any other acute complaints. HPI    Nursing Notes were reviewed. REVIEW OF SYSTEMS    (2-9 systems for level 4, 10 or more for level 5)     Review of Systems   All other systems reviewed and are negative. Except as noted above the remainder of the review of systems was reviewed and negative.        PAST MEDICAL HISTORY     Past Medical History:   Diagnosis Date    Hyperlipidemia     Hypertension          SURGICAL HISTORY       Past Surgical History:   Procedure Laterality Date    BLADDER SURGERY      CYSTOSCOPY Left 08/05/2022    Dr. Brown Slight - with stent placement    CYSTOSCOPY Left 8/5/2022    CYSTOSCOPY URETERAL STENT INSERTION performed by Neha Polo MD at [place] [address] Left 8/23/2022    CYSTOSCOPY URETEROSCOPY LASER-HLL performed by Neha Polo MD at [place] [address] Left 8/23/2022    CYSTOSCOPY URETERAL STENT [place] [place] performed by Neha Polo MD at [address] [place] [address] Left 08/05/2022    with Dr. Shannon Rouse       Previous Medications    ATORVASTATIN (LIPITOR) 40 MG TABLET    Take 40 mg by mouth in the morning. FLUCONAZOLE (DIFLUCAN) 100 MG TABLET    Take 1 tablet by mouth daily for 7 days    LISINOPRIL (PRINIVIL;ZESTRIL) 10 MG TABLET    Take 10 mg by mouth in the morning. MONTELUKAST (SINGULAIR) 10 MG TABLET    take 1 tablet by mouth once daily    PANTOPRAZOLE (PROTONIX) 40 MG TABLET    take 1 tablet by mouth twice a day       ALLERGIES     Sulfa antibiotics    FAMILY HISTORY     History reviewed. No pertinent family history. SOCIAL HISTORY       Social History     Socioeconomic History    Marital status: Single     Spouse name: None    Number of children: None    Years of education: None    Highest education level: None   Tobacco Use    Smoking status: Every Day     Packs/day: 0.50     Types: Cigarettes    Smokeless tobacco: Never   Substance and Sexual Activity    Alcohol use: Not Currently    Drug use: Never       SCREENINGS         Tanner Coma Scale  Eye Opening: Spontaneous  Best Verbal Response: Oriented  Best Motor Response: Obeys commands  Tanner Coma Scale Score: 15                     CIWA Assessment  BP: (!) 173/108  Heart Rate: 93                 PHYSICAL EXAM    (up to 7 for level 4, 8 or more for level 5)     ED Triage Vitals   BP Temp Temp src Heart Rate Resp SpO2 Height Weight   08/23/22 2047 08/23/22 2048 -- 08/23/22 2047 08/23/22 2047 08/23/22 2047 -- --   (!) 173/108 97 °F (36.1 °C)  93 17 95 %         Physical Exam  Constitutional:       General: She is not in acute distress. Appearance: She is well-developed. She is not ill-appearing or diaphoretic. HENT:      Head: Normocephalic and atraumatic. Eyes:      General:         Right eye: No discharge. Left eye: No discharge. Neck:      Trachea: No tracheal deviation. Cardiovascular:      Rate and Rhythm: Normal rate and regular rhythm. Heart sounds: No murmur heard. No friction rub.    Pulmonary:      Effort: Pulmonary effort is normal. No respiratory distress. Breath sounds: No stridor. No wheezing or rales. Chest:      Chest wall: No tenderness. Abdominal:      General: There is no distension. Palpations: Abdomen is soft. There is no mass. Tenderness: There is no abdominal tenderness. There is no guarding or rebound. Musculoskeletal:         General: No tenderness or deformity. Normal range of motion. Cervical back: Normal range of motion. Skin:     General: Skin is warm. Findings: No erythema or rash. Neurological:      Mental Status: She is alert and oriented to person, place, and time. Psychiatric:         Behavior: Behavior normal.       DIAGNOSTIC RESULTS     EKG: All EKG's are interpreted by the Emergency Department Physician who either signs or Co-signs this chart in the absence of a cardiologist.        RADIOLOGY:   Non-plain film images such as CT, Ultrasound and MRI are read by the radiologist. Plain radiographic images are visualized and preliminarily interpreted by the emergency physician with the below findings:        Interpretation per the Radiologist below, if available at the time of this note:    No orders to display         ED BEDSIDE ULTRASOUND:   Performed by ED Physician - none    LABS:  Labs Reviewed   CBC WITH AUTO DIFFERENTIAL - Abnormal; Notable for the following components:       Result Value    WBC 16.5 (*)     Seg Neutrophils 89 (*)     Lymphocytes 7 (*)     Monocytes 2 (*)     Eosinophils % 0 (*)     Immature Granulocytes 1 (*)     Segs Absolute 14.69 (*)     All other components within normal limits   BASIC METABOLIC PANEL - Abnormal; Notable for the following components:    Glucose 147 (*)     Creatinine 1.09 (*)     GFR Non- 53 (*)     All other components within normal limits       All other labs were within normal range or not returned as of this dictation.     EMERGENCY DEPARTMENT COURSE and DIFFERENTIAL DIAGNOSIS/MDM:   Vitals:    Vitals: 08/23/22 2047 08/23/22 2048   BP: (!) 173/108    Pulse: 93    Resp: 17    Temp:  97 °F (36.1 °C)   SpO2: 95%          [facility]  Number of Diagnoses or Management Options  Post-op pain  Diagnosis management comments: 66-year-old female present with concern for postop pain, postop day 1 from a lithotripsy. Patient initial assessment patient was mildly hypertensive but afebrile nontoxic-appearing. Patient's urologist Dr. [place] Arnold was spoken to and recommended basic labs and pain medication and have the patient follow-up in the morning. Patient did have leukocytosis but this was expected and I did not have any suspicion right now for an infection but this was likely secondary stress response degranulation since patient was afebrile and no other signs of infection. Patient was provided fluids and pain medication and nausea medication and reevaluation patient was feeling better spray 1 dose of oral Norco and a starter pack for Norco to go and instruction to follow-up with her urologist in the morning. At time discharge patient was afebrile not hypotensive or tachycardic and otherwise acutely clinically stable. Amount and/or Complexity of Data Reviewed  Decide to obtain previous medical records or to obtain history from someone other than the patient: yes          [facility]   Total Critical Care time was  minutes, excluding separately reportable procedures. There was a high probability of clinically significant/life threatening deterioration in the patient's condition which required my urgent intervention. CONSULTS:  None    PROCEDURES:  Unless otherwise noted below, none     Procedures        FINAL IMPRESSION      1.  Post-op pain          DISPOSITION/PLAN   DISPOSITION Decision To Discharge 08/23/2022 10:35:03 PM      PATIENT REFERRED TO:  Ros Jett MD  [address]  [phone]    In 1 day      DISCHARGE MEDICATIONS:  New Prescriptions No medications on file     Controlled Substances Monitoring:     No flowsheet data found.     (Please note that portions of this note were completed with a voice recognition program.  Efforts were made to edit the dictations but occasionally words are mis-transcribed.)    Dub Spatz, MD (electronically signed)  Attending Emergency Physician           Dub Spatz, MD  08/23/22 [id number]

## 2022-08-24 NOTE — PATIENT INSTRUCTIONS
FOR CONSTIPATION    It is very important to have regular, soft, daily bowel movements, because it WILL improve your urinary symptoms. Take Miralax (or generic equivalent) 17g once daily (one capful). Take every day, DO NOT skip days, as it must be taken daily in order to be effective. DO NOT take just \"as needed\". It is safe to take long term and is recommended for your symptoms. If one dose daily causes loose stools/diarrhea, decrease to 1/2 capful or 1/4 capful. If you cannot tolerate this medication, please notify our office. If one dose daily of Miralax is not sufficient to produce a soft, easy to pass daily stool, you may also add an over-the-counter stool softener capsule. For example: colace (docusate). For Miralax to have maximal effectiveness, be sure to increase your water intake - aim for 80 oz daily unless you are on a fluid-restriction from another provider. SURVEY:    You may be receiving a survey from [employer] regarding your visit today. Please complete the survey to enable us to provide the highest quality of care to you and your family. If you cannot score us a very good on any question, please call the office to discuss how we could have made your experience a very good one. Thank you.

## 2022-08-25 LAB
CULTURE: NO GROWTH
SPECIMEN DESCRIPTION: NORMAL

## 2022-08-25 NOTE — OP NOTE
[address]                                OPERATIVE REPORT    PATIENT NAME: Nai Casas                     :        1975  MED REC NO:   [id number]                              ROOM:  ACCOUNT NO:   [de-identified]                           ADMIT DATE: 2022  PROVIDER:     Guillermina Monson    DATE OF PROCEDURE:  2022    SURGEON:  Dr. Guillermina Monson. ASSISTANT:  None. PREOPERATIVE DIAGNOSIS:  Left ureteral calculus. POSTOPERATIVE DIAGNOSIS:  Left ureteral calculus. PROCEDURE PERFORMED:  Cystoscopy, left ureteroscopy, left holmium laser  lithotripsy, left basket extraction of calculi, and left ureteral stent  exchange. ANESTHESIA:  General.    COMPLICATIONS:  None. ESTIMATED BLOOD LOSS:  Minimal.    SPECIMENS:  Left ureteral calculus. PROSTHESIS:  A 6-Bangladeshi x 26 cm double-J ureteral stent. DISPOSITION:  Stable. FINDINGS:  An 8 mm left ureteral stone. INDICATIONS:  The patient is a 53-year-old female with a CT-proven left  ureteral calculus, here now for definitive therapy after a stent was  placed for temporizing therapy. DESCRIPTION OF PROCEDURE:  The patient was taken back to the operating  room after informed consent including all risks, benefits, and  alternatives were obtained. The patient was transferred from the [facility]  onto the operating room table, where she was induced under general  anesthesia, and given IV Cipro for preoperative antibiotic prophylaxis. To begin the case, she was prepped and draped in the normal sterile  fashion, and placed in dorsal lithotomy. She had a 22-Bangladeshi sheath  with a 30-degree lens passed through the urethra into the bladder. Once  in the bladder, we identified the left ureteral stent. This was grasped  and removed through the meatus. A 0.035-inch wire was passed up.   We  then placed a flexible ureteroscope over the [facility] into the ureter,  where we identified a stone in the mid ureter measuring approximately 8  mm. We were able to use a 270 micron laser fiber and ablate the stone  adequately. Once this was done, we were able to use the basket to  extract the calculi. We did see the large pieces. At this point in  time, all the calculi were removed. We were able to then remove the  ureteroscope leaving the Glidewire in place. We placed the cystoscope  over the Glidewire and placed a 6-Bruneian x 26 cm double-J ureteral stent  over the Glidewire up into the kidney. Glidewire was removed. Proximal  curl was confirmed via fluoroscopy, distal curl was confirmed via  visualization. At this point in time, stent string was attached to the  right thigh with Steris and benzoin. She was then awoken from general  anesthesia, transferred to the [facility], and taken to the PACU in  satisfactory condition by the Nursing and Anesthesia teams. PLAN:  The patient will be discharged home per PACU criterion and will  follow up with us in two to three days for stent removal via string.         Mark Hinton    D: 08/24/2022 15:40:40       T: 08/24/2022 22:01:52     [email or web address]  Job#: [id number]     Doc#: [id number]    CC:

## 2022-08-27 LAB
STONE COMPOSITION: NORMAL
STONE DESCRIPTION: NORMAL
STONE MASS: 107 MG

## 2022-08-29 ENCOUNTER — OFFICE VISIT (OUTPATIENT)
Dept: UROLOGY | Age: 47
End: 2022-08-29
Payer: COMMERCIAL

## 2022-08-29 ENCOUNTER — TELEPHONE (OUTPATIENT)
Dept: UROLOGY | Age: 47
End: 2022-08-29

## 2022-08-29 VITALS
SYSTOLIC BLOOD PRESSURE: 150 MMHG | HEART RATE: 82 BPM | BODY MASS INDEX: 36.34 KG/M2 | WEIGHT: 232 LBS | TEMPERATURE: 98.7 F | DIASTOLIC BLOOD PRESSURE: 83 MMHG

## 2022-08-29 DIAGNOSIS — K59.00 CONSTIPATION, UNSPECIFIED CONSTIPATION TYPE: ICD-10-CM

## 2022-08-29 DIAGNOSIS — N20.1 URETERAL CALCULUS: Primary | ICD-10-CM

## 2022-08-29 PROCEDURE — 4004F PT TOBACCO SCREEN RCVD TLK: CPT | Performed by: NURSE PRACTITIONER

## 2022-08-29 PROCEDURE — G8427 DOCREV CUR MEDS BY ELIG CLIN: HCPCS | Performed by: NURSE PRACTITIONER

## 2022-08-29 PROCEDURE — 99213 OFFICE O/P EST LOW 20 MIN: CPT | Performed by: NURSE PRACTITIONER

## 2022-08-29 PROCEDURE — G8417 CALC BMI ABV UP PARAM F/U: HCPCS | Performed by: NURSE PRACTITIONER

## 2022-08-29 RX ORDER — POLYETHYLENE GLYCOL 3350 17 G/17G
17 POWDER, FOR SOLUTION ORAL DAILY
Qty: 1530 G | Refills: 3 | Status: SHIPPED | OUTPATIENT
Start: 2022-08-29 | End: 2022-09-28

## 2022-08-29 ASSESSMENT — ENCOUNTER SYMPTOMS
COUGH: 0
WHEEZING: 0
ABDOMINAL PAIN: 0
COLOR CHANGE: 0
SHORTNESS OF BREATH: 0
NAUSEA: 0
CONSTIPATION: 1
BACK PAIN: 0
EYE REDNESS: 0
APNEA: 0
VOMITING: 0

## 2022-08-29 NOTE — PATIENT INSTRUCTIONS
It is very important to have regular, daily, bowel movements because this will improve urinary symptoms. Take Miralax (or generic equivalent) 17 g once daily (one capful). Do not skip days. If 1 dose daily causes loose stools/diarrhea, decrease to 1/2 dose or 1/4 dose but be sure to continue taking it daily (it is not the type of medication that you can just use \"as needed,\" must be taken daily to be effective).     Drink 80 ounces of water every day

## 2022-08-29 NOTE — TELEPHONE ENCOUNTER
----- Message from TIM Joyce Se - CNP sent at 8/29/2022  9:50 AM EDT -----  Call pt - urine cx reviewed and negative for UTI

## 2022-08-29 NOTE — PROGRESS NOTES
Pt had ureteral stent placed on 8/25/22 following left HLL. Stent removed via string in office today without difficulty. Pt tolerated removal well.

## 2022-08-29 NOTE — PROGRESS NOTES
HPI:        Patient is a 50 y.o. female in no acute distress. She is alert and oriented to person, place, and time. History  8/5/2022 cystoscopy left stent placement    8/23/2022  left HLL with stent exchange    Today  Here today to follow-up after stent removal. She did pull her own stent. Her pain has improved. She denies nausea, vomiting or fevers. She is having issues with constipation. She is concerned she has a rectocele. Past Medical History:   Diagnosis Date    Hyperlipidemia     Hypertension      Past Surgical History:   Procedure Laterality Date    BLADDER SURGERY      CYSTOSCOPY Left 08/05/2022    Dr. Jessenia Otero - with stent placement    CYSTOSCOPY Left 8/5/2022    CYSTOSCOPY URETERAL STENT INSERTION performed by Tino [facility] MD at [facility] Left 8/23/2022    CYSTOSCOPY URETEROSCOPY LASER-HLL performed by Tino Cruz MD at [facility] 8/23/2022    CYSTOSCOPY URETERAL STENT [address] performed by Tino Cruz MD at [address] [place] [address] Left 08/05/2022    with Dr. Jessenia Otero     Outpatient Encounter Medications as of 8/29/2022   Medication Sig Dispense Refill    alfuzosin (UROXATRAL) 10 MG extended release tablet Take 1 tablet by mouth daily 30 tablet 0    ciprofloxacin (CIPRO) 500 MG tablet Take 1 tablet by mouth 2 times daily for 7 days 14 tablet 0    montelukast (SINGULAIR) 10 MG tablet take 1 tablet by mouth once daily      pantoprazole (PROTONIX) 40 MG tablet take 1 tablet by mouth twice a day      lisinopril (PRINIVIL;ZESTRIL) 10 MG tablet Take 10 mg by mouth in the morning. atorvastatin (LIPITOR) 40 MG tablet Take 40 mg by mouth in the morning. No facility-administered encounter medications on file as of 8/29/2022.       Current Outpatient Medications on File Prior to Visit   Medication Sig Dispense Refill    alfuzosin (UROXATRAL) 10 MG extended release tablet Take 1 tablet by mouth daily 30 tablet 0    ciprofloxacin (CIPRO) 500 MG tablet Take 1 tablet by mouth 2 times daily for 7 days 14 tablet 0    montelukast (SINGULAIR) 10 MG tablet take 1 tablet by mouth once daily      pantoprazole (PROTONIX) 40 MG tablet take 1 tablet by mouth twice a day      lisinopril (PRINIVIL;ZESTRIL) 10 MG tablet Take 10 mg by mouth in the morning. atorvastatin (LIPITOR) 40 MG tablet Take 40 mg by mouth in the morning. No current facility-administered medications on file prior to visit. Sulfa antibiotics  No family history on file. Social History     Tobacco Use   Smoking Status Every Day    Packs/day: 0.50    Types: Cigarettes   Smokeless Tobacco Never       Social History     Substance and Sexual Activity   Alcohol Use Not Currently       Review of Systems   Constitutional:  Negative for appetite change, chills and fever. Eyes:  Negative for redness and visual disturbance. Respiratory:  Negative for apnea, cough, shortness of breath and wheezing. Cardiovascular:  Negative for chest pain and leg swelling. Gastrointestinal:  Positive for constipation. Negative for abdominal pain, nausea and vomiting. Genitourinary:  Negative for difficulty urinating, dyspareunia, dysuria, enuresis, flank pain, frequency, hematuria, pelvic pain, urgency, vaginal bleeding and vaginal discharge. Musculoskeletal:  Negative for back pain, joint swelling and myalgias. Skin:  Negative for color change, rash and wound. Neurological:  Negative for dizziness, tremors and numbness. Hematological:  Negative for adenopathy. Does not bruise/bleed easily. Psychiatric/Behavioral:  Negative for sleep disturbance. BP (!) 150/83 (Site: Right Upper Arm, Position: Sitting, Cuff Size: Large Adult)   Pulse 82   Temp 98.7 °F (37.1 °C)   Wt 232 lb (105.2 kg)   BMI 36.34 kg/m²       PHYSICAL EXAM:  Constitutional: Patient resting comfortably, in no acute distress. Neuro: Alert and oriented to person place and time.  Cranial nerves grossly intact. Psych: Mood and affect normal.  Skin: Warm, dry  HEENT: normocephalic, atraumatic  Lymphatics: No palpable lymphadenopathy  Lungs: Respiratory effort normal, unlabored  Cardiovascular:  Normal peripheral pulses  Abdomen: Soft, non-tender, non-distended with no organomegaly or palpable masses. : No CVA tenderness. Bladder non-tender and not distended. Pelvic: Deferred    Lab Results   Component Value Date    BUN 16 08/23/2022     Lab Results   Component Value Date    CREATININE 1.09 (H) 08/23/2022       ASSESSMENT:   Diagnosis Orders   1. Ureteral calculus  XR ABDOMEN (KUB) (SINGLE AP VIEW)      2. Constipation, unspecified constipation type  XR ABDOMEN (KUB) (SINGLE AP VIEW)              PLAN:  She will take miralax daily. She was sent home with written instructions on how to take this medication. Drink 80 ounces of water every day    F/U in 6 weeks with a KUB prior. We will complete a pelvic as well to evaluate for rectocele and assess bowel issues.

## 2022-10-31 ENCOUNTER — HOSPITAL ENCOUNTER (OUTPATIENT)
Age: 47
Discharge: HOME | End: 2022-11-02
Payer: COMMERCIAL

## 2022-10-31 ENCOUNTER — HOSPITAL ENCOUNTER (OUTPATIENT)
Dept: GENERAL RADIOLOGY | Age: 47
Discharge: HOME | End: 2022-11-02
Payer: COMMERCIAL

## 2022-10-31 DIAGNOSIS — K59.00 CONSTIPATION, UNSPECIFIED CONSTIPATION TYPE: ICD-10-CM

## 2022-10-31 DIAGNOSIS — N20.1 URETERAL CALCULUS: ICD-10-CM

## 2022-10-31 PROCEDURE — 74018 RADEX ABDOMEN 1 VIEW: CPT

## 2022-11-01 ENCOUNTER — HOSPITAL ENCOUNTER (OUTPATIENT)
Age: 47
Setting detail: SPECIMEN
Discharge: HOME | End: 2022-11-01
Payer: COMMERCIAL

## 2022-11-01 ENCOUNTER — OFFICE VISIT (OUTPATIENT)
Dept: UROLOGY | Age: 47
End: 2022-11-01
Payer: COMMERCIAL

## 2022-11-01 VITALS
WEIGHT: 246 LBS | BODY MASS INDEX: 38.61 KG/M2 | DIASTOLIC BLOOD PRESSURE: 86 MMHG | TEMPERATURE: 97.8 F | HEIGHT: 67 IN | SYSTOLIC BLOOD PRESSURE: 152 MMHG

## 2022-11-01 DIAGNOSIS — N20.0 KIDNEY STONES: ICD-10-CM

## 2022-11-01 DIAGNOSIS — R35.1 NOCTURIA: ICD-10-CM

## 2022-11-01 DIAGNOSIS — K59.00 CONSTIPATION, UNSPECIFIED CONSTIPATION TYPE: Primary | ICD-10-CM

## 2022-11-01 LAB
BACTERIA: ABNORMAL
BILIRUBIN URINE: NEGATIVE
COLOR: YELLOW
EPITHELIAL CELLS UA: ABNORMAL /HPF (ref 0–25)
GLUCOSE URINE: NEGATIVE
KETONES, URINE: NEGATIVE
LEUKOCYTE ESTERASE, URINE: NEGATIVE
MUCUS: ABNORMAL
NITRITE, URINE: NEGATIVE
PH UA: 6 (ref 5–9)
PROTEIN UA: NEGATIVE
RBC UA: ABNORMAL /HPF (ref 0–2)
SPECIFIC GRAVITY UA: 1.02 (ref 1.01–1.02)
TURBIDITY: CLEAR
URINE HGB: NEGATIVE
UROBILINOGEN, URINE: NORMAL
WBC UA: ABNORMAL /HPF (ref 0–5)

## 2022-11-01 PROCEDURE — G8427 DOCREV CUR MEDS BY ELIG CLIN: HCPCS | Performed by: PHYSICIAN ASSISTANT

## 2022-11-01 PROCEDURE — 81001 URINALYSIS AUTO W/SCOPE: CPT

## 2022-11-01 PROCEDURE — 99213 OFFICE O/P EST LOW 20 MIN: CPT | Performed by: PHYSICIAN ASSISTANT

## 2022-11-01 PROCEDURE — 87086 URINE CULTURE/COLONY COUNT: CPT

## 2022-11-01 PROCEDURE — 4004F PT TOBACCO SCREEN RCVD TLK: CPT | Performed by: PHYSICIAN ASSISTANT

## 2022-11-01 PROCEDURE — G8417 CALC BMI ABV UP PARAM F/U: HCPCS | Performed by: PHYSICIAN ASSISTANT

## 2022-11-01 PROCEDURE — G8484 FLU IMMUNIZE NO ADMIN: HCPCS | Performed by: PHYSICIAN ASSISTANT

## 2022-11-01 PROCEDURE — 86403 PARTICLE AGGLUT ANTBDY SCRN: CPT

## 2022-11-01 RX ORDER — FLUTICASONE PROPIONATE 50 MCG
SPRAY, SUSPENSION (ML) NASAL
COMMUNITY
Start: 2022-10-19

## 2022-11-01 ASSESSMENT — ENCOUNTER SYMPTOMS
NAUSEA: 0
VOMITING: 0
COLOR CHANGE: 0
CONSTIPATION: 1
EYE REDNESS: 0
ABDOMINAL PAIN: 0
WHEEZING: 0
COUGH: 0
BACK PAIN: 0
SHORTNESS OF BREATH: 0
APNEA: 0

## 2022-11-01 NOTE — PROGRESS NOTES
HPI:    Patient is a 50 y.o. female in no acute distress. She is alert and oriented to person, place, and time. History  8/5/2022 cystoscopy left stent placement    8/23/2022  left HLL with stent exchange    Today:  Patient is here today for follow-up constipation and pelvic fullness. At last visit we recommended that she starts MiraLAX for her constipation. Patient states that she is voiding 3 times throughout the nighttime. She states that prior to her kidney stone she was only getting up once or twice a night. Patient states that she was taking MiraLAX daily but had developed loose stools. She decreased her intake of MiraLAX every couple days and is now taking half capful because she is concerned her stool is never solid. She does not want to have a pelvic exam today because she feels as though she does not have control of her bowels. She states that she has had issues for many years when it comes to her bowels. She denies any fever or chills. She states that she had COVID approximately a year ago and she only eats 1 meal a day. She states that the only thing that really taste good to her are Fritos. She states that she also drinks a [employer] root beer daily. She drinks 2 bottles of water right before bed. Patient did have a KUB prior visit it was independently reviewed showing a 3 mm right renal calculi.     Past Medical History:   Diagnosis Date    Hyperlipidemia     Hypertension      Past Surgical History:   Procedure Laterality Date    BLADDER SURGERY      CYSTOSCOPY Left 08/05/2022    Dr. Adrian Chavarria - with stent placement    CYSTOSCOPY Left 8/5/2022    CYSTOSCOPY URETERAL STENT INSERTION performed by Terri Mueller MD at [facility] Left 8/23/2022    CYSTOSCOPY URETEROSCOPY LASER-HLL performed by Terri Mueller MD at [facility] Left 8/23/2022    CYSTOSCOPY URETERAL STENT [address] performed by Terri Mueller MD at [address] [place] [address] PLACEMENT Left 08/05/2022    with Dr. Renetta Kirkpatrick     Outpatient Encounter Medications as of 11/1/2022   Medication Sig Dispense Refill    fluticasone (FLONASE) 50 MCG/ACT nasal spray instill 2 sprays into each nostril twice a day      montelukast (SINGULAIR) 10 MG tablet take 1 tablet by mouth once daily      pantoprazole (PROTONIX) 40 MG tablet Take 40 mg by mouth daily      lisinopril (PRINIVIL;ZESTRIL) 10 MG tablet Take 10 mg by mouth in the morning. atorvastatin (LIPITOR) 40 MG tablet Take 40 mg by mouth in the morning. No facility-administered encounter medications on file as of 11/1/2022. Current Outpatient Medications on File Prior to Visit   Medication Sig Dispense Refill    fluticasone (FLONASE) 50 MCG/ACT nasal spray instill 2 sprays into each nostril twice a day      montelukast (SINGULAIR) 10 MG tablet take 1 tablet by mouth once daily      pantoprazole (PROTONIX) 40 MG tablet Take 40 mg by mouth daily      lisinopril (PRINIVIL;ZESTRIL) 10 MG tablet Take 10 mg by mouth in the morning. atorvastatin (LIPITOR) 40 MG tablet Take 40 mg by mouth in the morning. No current facility-administered medications on file prior to visit. Sulfa antibiotics  No family history on file. Social History     Tobacco Use   Smoking Status Every Day    Packs/day: 0.50    Types: Cigarettes   Smokeless Tobacco Never       Social History     Substance and Sexual Activity   Alcohol Use Not Currently       Review of Systems   Constitutional:  Negative for appetite change, chills and fever. Eyes:  Negative for redness and visual disturbance. Respiratory:  Negative for apnea, cough, shortness of breath and wheezing. Cardiovascular:  Negative for chest pain and leg swelling. Gastrointestinal:  Positive for constipation. Negative for abdominal pain, nausea and vomiting.    Genitourinary:  Negative for difficulty urinating, dyspareunia, dysuria, enuresis, flank pain, frequency, hematuria, pelvic pain, urgency, vaginal bleeding and vaginal discharge. Positive for nocturia x3   Musculoskeletal:  Negative for back pain, joint swelling and myalgias. Skin:  Negative for color change, rash and wound. Neurological:  Negative for dizziness, tremors and numbness. Hematological:  Negative for adenopathy. Does not bruise/bleed easily. Psychiatric/Behavioral:  Negative for sleep disturbance. BP (!) 152/86   Temp 97.8 °F (36.6 °C) (Infrared)   Ht 5' 7\" (1.702 m)   Wt 246 lb (111.6 kg)   BMI 38.53 kg/m²       PHYSICAL EXAM:  Constitutional: Patient resting comfortably, in no acute distress. Neuro: Alert and oriented to person place and time. Psych: Mood and affect normal.  HEENT: normocephalic, atraumatic  Lungs: Respiratory effort normal, unlabored  Abdomen: Soft, non-tender, non-distended  : No CVA tenderness. Pelvic: deferred     Lab Results   Component Value Date    BUN 16 08/23/2022     Lab Results   Component Value Date    CREATININE 1.09 (H) 08/23/2022       ASSESSMENT:   Diagnosis Orders   1. Constipation, unspecified constipation type        2. Nocturia  Culture, Urine    Urinalysis with Microscopic      3. Kidney stones  XR ABDOMEN (KUB) (SINGLE AP VIEW)        PLAN:  Will check urinalysis culture secondary to her nocturia. Stop liquids 2 hours prior to bedtime    Discussed bladder irritants thoroughly. Patient instructed to avoid/minimize intake of food/drinks such as: coffee, tea, caffeine, alcohol, carbonated beverages, dark soda/pop, spicy/acidic foods. Was sent home with a extensive list, including non-irritating alternatives.      STONE PREVENTION    To prevent future stone formation:    1) DO drink ~65-80 oz (2-2.5L) of water per day to stay adequately hydrated     2) AVOID/LIMIT intake of \"bad fluids\": soda/pop, coffee, tea, alcohol, energy drinks, any beverage with caffeine can act to dehydrate you       \"BAD FLUIDS\" DO NOT COUNT TOWARD THE 65-80oz of water    3) REDUCE consumption of sodium/salt - DO NOT salt your food, read labels (lunch meats, canned soups, prepared meals are high in salt), choose low salt options    4) DO NOT EAT animal protein/meat more than 2 meals a day - this includes red meat, pork, poultry and fish    Reduce MiraLAX to 1 teaspoon daily    Follow-up in 3 months to reassess her constipation and for possible pelvic exam to evaluate her for rectocele.     KUB 1 year

## 2022-11-01 NOTE — PATIENT INSTRUCTIONS
STONE PREVENTION    To prevent future stone formation:    1) DO drink ~65-80 oz (2-2.5L) of water per day to stay adequately hydrated     2) AVOID/LIMIT intake of \"bad fluids\": soda/pop, coffee, tea, alcohol, energy drinks, any beverage with caffeine can act to dehydrate you       \"BAD FLUIDS\" DO NOT COUNT TOWARD THE 65-80oz of water    3) REDUCE consumption of sodium/salt - DO NOT salt your food, read labels (lunch meats, canned soups, prepared meals are high in salt), choose low salt options    4) DO NOT EAT animal protein/meat more than 2 meals a day - this includes red meat, pork, poultry and fish    SURVEY:    You may be receiving a survey from [employer] regarding your visit today. Please complete the survey to enable us to provide the highest quality of care to you and your family. If you cannot score us a very good on any question, please call the office to discuss how we could have made your experience a very good one. Thank you.

## 2022-11-02 LAB
CULTURE: ABNORMAL
SPECIMEN DESCRIPTION: ABNORMAL

## 2023-02-28 ENCOUNTER — OFFICE VISIT (OUTPATIENT)
Dept: UROLOGY | Age: 48
End: 2023-02-28
Payer: COMMERCIAL

## 2023-02-28 VITALS
TEMPERATURE: 97.5 F | SYSTOLIC BLOOD PRESSURE: 139 MMHG | HEART RATE: 81 BPM | BODY MASS INDEX: 40.56 KG/M2 | DIASTOLIC BLOOD PRESSURE: 90 MMHG | WEIGHT: 258.4 LBS | HEIGHT: 67 IN

## 2023-02-28 DIAGNOSIS — K59.00 CONSTIPATION, UNSPECIFIED CONSTIPATION TYPE: Primary | ICD-10-CM

## 2023-02-28 DIAGNOSIS — R35.1 NOCTURIA: ICD-10-CM

## 2023-02-28 PROCEDURE — G8484 FLU IMMUNIZE NO ADMIN: HCPCS | Performed by: PHYSICIAN ASSISTANT

## 2023-02-28 PROCEDURE — 4004F PT TOBACCO SCREEN RCVD TLK: CPT | Performed by: PHYSICIAN ASSISTANT

## 2023-02-28 PROCEDURE — G8417 CALC BMI ABV UP PARAM F/U: HCPCS | Performed by: PHYSICIAN ASSISTANT

## 2023-02-28 PROCEDURE — 51798 US URINE CAPACITY MEASURE: CPT | Performed by: PHYSICIAN ASSISTANT

## 2023-02-28 PROCEDURE — 3017F COLORECTAL CA SCREEN DOC REV: CPT | Performed by: PHYSICIAN ASSISTANT

## 2023-02-28 PROCEDURE — 99213 OFFICE O/P EST LOW 20 MIN: CPT | Performed by: PHYSICIAN ASSISTANT

## 2023-02-28 PROCEDURE — G8427 DOCREV CUR MEDS BY ELIG CLIN: HCPCS | Performed by: PHYSICIAN ASSISTANT

## 2023-02-28 RX ORDER — POLYETHYLENE GLYCOL 3350 17 G/17G
POWDER, FOR SOLUTION ORAL
COMMUNITY
Start: 2023-01-26

## 2023-02-28 ASSESSMENT — ENCOUNTER SYMPTOMS
COLOR CHANGE: 0
SHORTNESS OF BREATH: 0
BACK PAIN: 0
APNEA: 0
COUGH: 0
WHEEZING: 0
VOMITING: 0
CONSTIPATION: 0
ABDOMINAL PAIN: 0
NAUSEA: 0
EYE REDNESS: 0

## 2023-02-28 NOTE — PROGRESS NOTES
HPI:    Patient is a 46 y.o. female in no acute distress. She is alert and oriented to person, place, and time. History  8/5/2022 cystoscopy left stent placement    8/23/2022  left HLL with stent exchange    Today:  Patient is here today for follow-up constipation and pelvic fullness. At last visit we recommended that she reduces her MiraLAX a little bit more to have good soft daily bowel movement. We wanted to see if this would help with her pelvic fullness. Patient states that she is not voiding as she normally did but she also has not been drinking as many fluids as what she did in the past.  She states that this was likely secondary to having a sinus infection and being treated with course of antibiotics. Patient states that she no longer has any pelvic fullness. She states that she is now having a daily bowel movement. She has nocturia x1-2, not bothersome. Patient voided 2 hours before visit. PVR- 50ml.      Past Medical History:   Diagnosis Date    Hyperlipidemia     Hypertension      Past Surgical History:   Procedure Laterality Date    BLADDER SURGERY      CYSTOSCOPY Left 08/05/2022    Dr. Ajay Bustamante - with stent placement    CYSTOSCOPY Left 8/5/2022    CYSTOSCOPY URETERAL STENT INSERTION performed by Mikey Robin MD at [address] [place] [address] Left 8/23/2022    CYSTOSCOPY URETEROSCOPY LASER-HLL performed by Mikey Robin MD at [address] [place] [address] Left 8/23/2022    CYSTOSCOPY URETERAL STENT [address] performed by Mikey Robin MD at [place] [address] Left 08/05/2022    with Dr. Ajay Bustamante     Outpatient Encounter Medications as of 2/28/2023   Medication Sig Dispense Refill    VENTOLIN  (90 Base) MCG/ACT inhaler inhale 2 puffs by mouth and INTO THE LUNGS every 6 hours if needed for wheezing      polyethylene glycol (GLYCOLAX) 17 GM/SCOOP powder take 17GM (DISSOLVED IN WATER) by mouth once daily      fluticasone (FLONASE) 50 MCG/ACT nasal spray instill 2 sprays into each nostril twice a day      montelukast (SINGULAIR) 10 MG tablet take 1 tablet by mouth once daily      pantoprazole (PROTONIX) 40 MG tablet Take 40 mg by mouth daily      lisinopril (PRINIVIL;ZESTRIL) 10 MG tablet Take 10 mg by mouth in the morning. atorvastatin (LIPITOR) 40 MG tablet Take 40 mg by mouth in the morning. No facility-administered encounter medications on file as of 2/28/2023. Current Outpatient Medications on File Prior to Visit   Medication Sig Dispense Refill    VENTOLIN  (90 Base) MCG/ACT inhaler inhale 2 puffs by mouth and INTO THE LUNGS every 6 hours if needed for wheezing      polyethylene glycol (GLYCOLAX) 17 GM/SCOOP powder take 17GM (DISSOLVED IN WATER) by mouth once daily      fluticasone (FLONASE) 50 MCG/ACT nasal spray instill 2 sprays into each nostril twice a day      montelukast (SINGULAIR) 10 MG tablet take 1 tablet by mouth once daily      pantoprazole (PROTONIX) 40 MG tablet Take 40 mg by mouth daily      lisinopril (PRINIVIL;ZESTRIL) 10 MG tablet Take 10 mg by mouth in the morning. atorvastatin (LIPITOR) 40 MG tablet Take 40 mg by mouth in the morning. No current facility-administered medications on file prior to visit. Sulfa antibiotics and Zithromax [azithromycin]  No family history on file. Social History     Tobacco Use   Smoking Status Every Day    Packs/day: 0.50    Types: Cigarettes   Smokeless Tobacco Never       Social History     Substance and Sexual Activity   Alcohol Use Not Currently       Review of Systems   Constitutional:  Negative for appetite change, chills and fever. Eyes:  Negative for redness and visual disturbance. Respiratory:  Negative for apnea, cough, shortness of breath and wheezing. Cardiovascular:  Negative for chest pain and leg swelling. Gastrointestinal:  Negative for abdominal pain, constipation, nausea and vomiting.    Genitourinary:  Negative for difficulty urinating, dyspareunia, dysuria, enuresis, flank pain, frequency, hematuria, pelvic pain, urgency, vaginal bleeding and vaginal discharge. Positive for nocturia   Musculoskeletal:  Negative for back pain, joint swelling and myalgias. Skin:  Negative for color change, rash and wound. Neurological:  Negative for dizziness, tremors and numbness. Hematological:  Negative for adenopathy. Does not bruise/bleed easily. Psychiatric/Behavioral:  Negative for sleep disturbance. BP (!) 139/90 (Site: Right Upper Arm, Position: Sitting, Cuff Size: Large Adult)   Pulse 81   Temp 97.5 °F (36.4 °C)   Ht 5' 7\" (1.702 m)   Wt 258 lb 6.4 oz (117.2 kg)   BMI 40.47 kg/m²       PHYSICAL EXAM:  Constitutional: Patient resting comfortably, in no acute distress. Neuro: Alert and oriented to person place and time. Psych: Mood and affect normal.  HEENT: normocephalic, atraumatic  Lungs: Respiratory effort normal, unlabored  Abdomen: Soft, non-tender, non-distended  : No CVA tenderness. Pelvic: deferred     Lab Results   Component Value Date    BUN 16 08/23/2022     Lab Results   Component Value Date    CREATININE 1.09 (H) 08/23/2022       ASSESSMENT:   Diagnosis Orders   1. Constipation, unspecified constipation type        2.  Nocturia  MD DANITZA POST-VOIDING RESIDUAL URINE&/BLADDER CAP          PLAN:  Continue MiraLAX    Avoid bladder irritants    Stone f/u 11/2023

## 2023-02-28 NOTE — PATIENT INSTRUCTIONS
SURVEY:    You may be receiving a survey from [employer] regarding your visit today. Please complete the survey to enable us to provide the highest quality of care to you and your family. If you cannot score us a very good on any question, please call the office to discuss how we could have made your experience a very good one. Thank you.

## 2023-02-28 NOTE — PROGRESS NOTES
HPI:        Patient is a 46 y.o. female in no acute distress. She is alert and oriented to person, place, and time. History  8/5/2022 cystoscopy left stent placement    8/23/2022  left HLL with stent exchange    Past Medical History:   Diagnosis Date    Hyperlipidemia     Hypertension      Past Surgical History:   Procedure Laterality Date    BLADDER SURGERY      CYSTOSCOPY Left 08/05/2022    Dr. Flaco Rankin - with stent placement    CYSTOSCOPY Left 8/5/2022    CYSTOSCOPY URETERAL STENT INSERTION performed by Wilberto Trinh MD at [address] Left 8/23/2022    CYSTOSCOPY URETEROSCOPY LASER-HLL performed by Wilberto Trinh MD at [address] Left 8/23/2022    CYSTOSCOPY URETERAL STENT [address] performed by Wilberto Trinh MD at [address] [place] [address] Left 08/05/2022    with Dr. Flaco Rankin     Outpatient Encounter Medications as of 2/28/2023   Medication Sig Dispense Refill    fluticasone (FLONASE) 50 MCG/ACT nasal spray instill 2 sprays into each nostril twice a day      montelukast (SINGULAIR) 10 MG tablet take 1 tablet by mouth once daily      pantoprazole (PROTONIX) 40 MG tablet Take 40 mg by mouth daily      lisinopril (PRINIVIL;ZESTRIL) 10 MG tablet Take 10 mg by mouth in the morning. atorvastatin (LIPITOR) 40 MG tablet Take 40 mg by mouth in the morning. No facility-administered encounter medications on file as of 2/28/2023. Current Outpatient Medications on File Prior to Visit   Medication Sig Dispense Refill    fluticasone (FLONASE) 50 MCG/ACT nasal spray instill 2 sprays into each nostril twice a day      montelukast (SINGULAIR) 10 MG tablet take 1 tablet by mouth once daily      pantoprazole (PROTONIX) 40 MG tablet Take 40 mg by mouth daily      lisinopril (PRINIVIL;ZESTRIL) 10 MG tablet Take 10 mg by mouth in the morning. atorvastatin (LIPITOR) 40 MG tablet Take 40 mg by mouth in the morning.        No current facility-administered medications on file prior to visit. Sulfa antibiotics  No family history on file. Social History     Tobacco Use   Smoking Status Every Day    Packs/day: 0.50    Types: Cigarettes   Smokeless Tobacco Never       Social History     Substance and Sexual Activity   Alcohol Use Not Currently       Review of Systems    There were no vitals taken for this visit. PHYSICAL EXAM:  Constitutional: Patient resting comfortably, in no acute distress. Neuro: Alert and oriented to person place and time. Cranial nerves grossly intact. Psych: Mood and affect normal.  Skin: Warm, dry  HEENT: normocephalic, atraumatic  Lymphatics: No palpable lymphadenopathy  Lungs: Respiratory effort normal, unlabored  Cardiovascular:  Normal peripheral pulses  Abdomen: Soft, non-tender, non-distended with no organomegaly or palpable masses. : No CVA tenderness. Bladder non-tender and not distended.   Pelvic: ***    Lab Results   Component Value Date    BUN 16 08/23/2022     Lab Results   Component Value Date    CREATININE 1.09 (H) 08/23/2022       ASSESSMENT:  ***      PLAN:  ***

## 2023-11-27 ENCOUNTER — TELEPHONE (OUTPATIENT)
Dept: UROLOGY | Age: 48
End: 2023-11-27

## 2023-11-27 DIAGNOSIS — N20.0 KIDNEY STONES: Primary | ICD-10-CM

## 2024-01-28 ENCOUNTER — HOSPITAL ENCOUNTER (EMERGENCY)
Age: 49
Discharge: HOME | End: 2024-01-28
Attending: STUDENT IN AN ORGANIZED HEALTH CARE EDUCATION/TRAINING PROGRAM
Payer: COMMERCIAL

## 2024-01-28 VITALS
HEART RATE: 86 BPM | BODY MASS INDEX: 39.24 KG/M2 | OXYGEN SATURATION: 96 % | WEIGHT: 250 LBS | HEIGHT: 67 IN | RESPIRATION RATE: 20 BRPM | SYSTOLIC BLOOD PRESSURE: 146 MMHG | TEMPERATURE: 97.9 F | DIASTOLIC BLOOD PRESSURE: 88 MMHG

## 2024-01-28 DIAGNOSIS — S61.211A LACERATION OF LEFT INDEX FINGER W/O FOREIGN BODY W/O DAMAGE TO NAIL, INITIAL ENCOUNTER: Primary | ICD-10-CM

## 2024-01-28 PROCEDURE — 12001 RPR S/N/AX/GEN/TRNK 2.5CM/<: CPT

## 2024-01-28 PROCEDURE — 99282 EMERGENCY DEPT VISIT SF MDM: CPT

## 2024-01-28 ASSESSMENT — PAIN DESCRIPTION - ORIENTATION: ORIENTATION: LEFT

## 2024-01-28 ASSESSMENT — PAIN DESCRIPTION - LOCATION: LOCATION: FINGER (COMMENT WHICH ONE)

## 2024-01-28 ASSESSMENT — PAIN DESCRIPTION - PAIN TYPE: TYPE: ACUTE PAIN

## 2024-01-28 ASSESSMENT — LIFESTYLE VARIABLES
HOW OFTEN DO YOU HAVE A DRINK CONTAINING ALCOHOL: NEVER
HOW MANY STANDARD DRINKS CONTAINING ALCOHOL DO YOU HAVE ON A TYPICAL DAY: PATIENT DOES NOT DRINK

## 2024-01-28 ASSESSMENT — PAIN - FUNCTIONAL ASSESSMENT: PAIN_FUNCTIONAL_ASSESSMENT: 0-10

## 2024-01-28 ASSESSMENT — PAIN DESCRIPTION - DESCRIPTORS: DESCRIPTORS: BURNING;THROBBING

## 2024-01-28 ASSESSMENT — PAIN DESCRIPTION - ONSET: ONSET: SUDDEN

## 2024-01-28 ASSESSMENT — PAIN SCALES - GENERAL: PAINLEVEL_OUTOF10: 3

## 2024-01-29 NOTE — ED PROVIDER NOTES
Pharynx: Oropharynx is clear.   Eyes:      Extraocular Movements: Extraocular movements intact.      Conjunctiva/sclera: Conjunctivae normal.      Pupils: Pupils are equal, round, and reactive to light.   Skin:     Comments: Very small 0.75 cm laceration across the top of the left pointer finger superior to the nail   Neurological:      Mental Status: She is alert.           DDX/DIAGNOSTIC RESULTS / EMERGENCY DEPARTMENT COURSE / MDM     Medical Decision Making  The patient has small laceration but is having a little bit difficulty straightening her finger all the way.  Likely some muscle belly injury however the laceration is not deep.  Will glue the laceration closed and have the finger splint placed on their extending the finger kept in extended position.  Will have her follow-up with her PCP within the week to ensure that the finger is getting better and have a make referral to hand surgery if it does not.    Risk  Prescription drug management.          EKG      All EKG's are interpreted by the Emergency Department Physician who either signs or Co-signs this chart in the absence of a cardiologist.    EMERGENCY DEPARTMENT COURSE:           PROCEDURES:      CONSULTS:  None    CRITICAL CARE:  There was significant risk of life threatening deterioration of patient's condition requiring my direct management. Critical care time 0 minutes, excluding any documented procedures.    FINAL IMPRESSION      1. Laceration of left index finger w/o foreign body w/o damage to nail, initial encounter          DISPOSITION / PLAN     DISPOSITION Decision To Discharge 01/28/2024 10:21:45 PM      PATIENT REFERRED TO:  Denise Caldera  [address] [facility] [address] [phone]    Schedule an appointment as soon as possible for a visit       [facility] ED  [address] [place] [id number] [phone]  Go to   As needed      DISCHARGE MEDICATIONS:  New Prescriptions    No medications on file       Raheel

## 2024-07-07 ENCOUNTER — HOSPITAL ENCOUNTER (EMERGENCY)
Age: 49
Discharge: HOME | End: 2024-07-07
Payer: COMMERCIAL

## 2024-07-07 ENCOUNTER — APPOINTMENT (OUTPATIENT)
Dept: GENERAL RADIOLOGY | Age: 49
End: 2024-07-07
Payer: COMMERCIAL

## 2024-07-07 VITALS
HEIGHT: 67 IN | DIASTOLIC BLOOD PRESSURE: 51 MMHG | OXYGEN SATURATION: 96 % | SYSTOLIC BLOOD PRESSURE: 134 MMHG | WEIGHT: 244 LBS | RESPIRATION RATE: 20 BRPM | TEMPERATURE: 98.3 F | HEART RATE: 70 BPM | BODY MASS INDEX: 38.3 KG/M2

## 2024-07-07 DIAGNOSIS — J20.9 BRONCHITIS WITH BRONCHOSPASM: Primary | ICD-10-CM

## 2024-07-07 DIAGNOSIS — T36.95XA ALLERGIC REACTION DUE TO ANTIBACTERIAL DRUG: ICD-10-CM

## 2024-07-07 PROCEDURE — 99284 EMERGENCY DEPT VISIT MOD MDM: CPT

## 2024-07-07 PROCEDURE — 6360000002 HC RX W HCPCS: Performed by: PHYSICIAN ASSISTANT

## 2024-07-07 PROCEDURE — 94664 DEMO&/EVAL PT USE INHALER: CPT

## 2024-07-07 PROCEDURE — 2580000003 HC RX 258

## 2024-07-07 PROCEDURE — 96375 TX/PRO/DX INJ NEW DRUG ADDON: CPT

## 2024-07-07 PROCEDURE — 71045 X-RAY EXAM CHEST 1 VIEW: CPT

## 2024-07-07 PROCEDURE — 6370000000 HC RX 637 (ALT 250 FOR IP): Performed by: PHYSICIAN ASSISTANT

## 2024-07-07 PROCEDURE — 96374 THER/PROPH/DIAG INJ IV PUSH: CPT

## 2024-07-07 PROCEDURE — 96376 TX/PRO/DX INJ SAME DRUG ADON: CPT

## 2024-07-07 RX ORDER — METHYLPREDNISOLONE SODIUM SUCCINATE 125 MG/2ML
125 INJECTION, POWDER, LYOPHILIZED, FOR SOLUTION INTRAMUSCULAR; INTRAVENOUS ONCE
Status: COMPLETED | OUTPATIENT
Start: 2024-07-07 | End: 2024-07-07

## 2024-07-07 RX ORDER — DOXYCYCLINE HYCLATE 100 MG/1
100 CAPSULE ORAL 2 TIMES DAILY
Qty: 14 CAPSULE | Refills: 0 | Status: SHIPPED | OUTPATIENT
Start: 2024-07-07 | End: 2024-07-14

## 2024-07-07 RX ORDER — WATER 10 ML/10ML
INJECTION INTRAMUSCULAR; INTRAVENOUS; SUBCUTANEOUS
Status: COMPLETED
Start: 2024-07-07 | End: 2024-07-07

## 2024-07-07 RX ORDER — DIPHENHYDRAMINE HYDROCHLORIDE 50 MG/ML
25 INJECTION INTRAMUSCULAR; INTRAVENOUS ONCE
Status: COMPLETED | OUTPATIENT
Start: 2024-07-07 | End: 2024-07-07

## 2024-07-07 RX ORDER — METHYLPREDNISOLONE 4 MG/1
TABLET ORAL
Qty: 1 KIT | Refills: 0 | Status: SHIPPED | OUTPATIENT
Start: 2024-07-07 | End: 2024-07-13

## 2024-07-07 RX ORDER — ALBUTEROL SULFATE 90 UG/1
2 AEROSOL, METERED RESPIRATORY (INHALATION) 4 TIMES DAILY PRN
Qty: 18 G | Refills: 0 | Status: SHIPPED | OUTPATIENT
Start: 2024-07-07

## 2024-07-07 RX ORDER — IPRATROPIUM BROMIDE AND ALBUTEROL SULFATE 2.5; .5 MG/3ML; MG/3ML
1 SOLUTION RESPIRATORY (INHALATION) ONCE
Status: COMPLETED | OUTPATIENT
Start: 2024-07-07 | End: 2024-07-07

## 2024-07-07 RX ADMIN — METHYLPREDNISOLONE SODIUM SUCCINATE 125 MG: 125 INJECTION INTRAMUSCULAR; INTRAVENOUS at 21:34

## 2024-07-07 RX ADMIN — WATER 2 ML: 1 INJECTION INTRAMUSCULAR; INTRAVENOUS; SUBCUTANEOUS at 21:34

## 2024-07-07 RX ADMIN — IPRATROPIUM BROMIDE AND ALBUTEROL SULFATE 1 DOSE: .5; 2.5 SOLUTION RESPIRATORY (INHALATION) at 21:58

## 2024-07-07 RX ADMIN — DIPHENHYDRAMINE HYDROCHLORIDE 25 MG: 50 INJECTION INTRAMUSCULAR; INTRAVENOUS at 21:36

## 2024-07-07 RX ADMIN — DIPHENHYDRAMINE HYDROCHLORIDE 25 MG: 50 INJECTION INTRAMUSCULAR; INTRAVENOUS at 22:24

## 2024-07-07 ASSESSMENT — PAIN - FUNCTIONAL ASSESSMENT: PAIN_FUNCTIONAL_ASSESSMENT: NONE - DENIES PAIN

## 2024-07-08 NOTE — ED PROVIDER NOTES
wheezing present.   Skin:     General: Skin is warm and dry.   Neurological:      Mental Status: She is alert.   Psychiatric:         Mood and Affect: Mood is anxious. Affect is tearful.         Behavior: Behavior normal.             DIAGNOSTIC RESULTS     Interpretation per the Radiologist below, if available at the time of this note:    XR CHEST PORTABLE   Final Result   No acute abnormality.               LABS:  Labs Reviewed - No data to display    All other labs were within normal range or not returned as of this dictation.    EMERGENCY DEPARTMENT COURSE and DIFFERENTIAL DIAGNOSIS/MDM:     Patient seen and evaluated in the emergency department with shortness of breath and bronchitis.  Patient had also began Augmentin and began having signs of an allergic reaction with facial flushing.  Chest x-ray showed no obvious pneumonia.  Vital signs were stable without hypoxia.  Patient did also have significant anxiety and tearfulness here.  She was able to calm down.  Reaction was treated with Solu-Medrol and Benadryl.  Plan is to switch the patient from Augmentin to doxycycline.  Patient will be tapered on prednisone for allergic reaction and bronchitis.  Plan is discharge home.           REASSESSMENT          FINAL IMPRESSION      1. Bronchitis with bronchospasm    2. Allergic reaction due to antibacterial drug          DISPOSITION/PLAN     DISPOSITION Decision To Discharge 07/07/2024 10:03:46 PM      PATIENT REFERRED TO:  [place] Calder[place], [place]  [address] [facility] [address] [phone]    Schedule an appointment as soon as possible for a visit         DISCHARGE MEDICATIONS:  New Prescriptions    ALBUTEROL (PROVENTIL) (5 MG/ML) 0.5% NEBULIZER SOLUTION    Take 0.5 mLs by nebulization every 6 hours as needed for Wheezing    ALBUTEROL SULFATE HFA (VENTOLIN HFA) 108 (90 BASE) MCG/ACT INHALER    Inhale 2 puffs into the lungs 4 times daily as needed for Wheezing    DOXYCYCLINE HYCLATE (VIBRAMYCIN) 100 MG

## 2024-07-18 ENCOUNTER — HOSPITAL ENCOUNTER (OUTPATIENT)
Age: 49
Discharge: HOME | End: 2024-07-20
Payer: COMMERCIAL

## 2024-07-18 ENCOUNTER — HOSPITAL ENCOUNTER (OUTPATIENT)
Dept: GENERAL RADIOLOGY | Age: 49
Discharge: HOME | End: 2024-07-20
Payer: COMMERCIAL

## 2024-07-18 DIAGNOSIS — N20.0 KIDNEY STONES: ICD-10-CM

## 2024-07-18 PROCEDURE — 74018 RADEX ABDOMEN 1 VIEW: CPT

## 2024-07-24 ENCOUNTER — OFFICE VISIT (OUTPATIENT)
Dept: UROLOGY | Age: 49
End: 2024-07-24
Payer: COMMERCIAL

## 2024-07-24 VITALS
BODY MASS INDEX: 38.69 KG/M2 | SYSTOLIC BLOOD PRESSURE: 126 MMHG | DIASTOLIC BLOOD PRESSURE: 90 MMHG | WEIGHT: 247 LBS | HEART RATE: 92 BPM | TEMPERATURE: 97.8 F

## 2024-07-24 DIAGNOSIS — N20.0 KIDNEY STONES: Primary | ICD-10-CM

## 2024-07-24 PROCEDURE — G8427 DOCREV CUR MEDS BY ELIG CLIN: HCPCS | Performed by: PHYSICIAN ASSISTANT

## 2024-07-24 PROCEDURE — 3017F COLORECTAL CA SCREEN DOC REV: CPT | Performed by: PHYSICIAN ASSISTANT

## 2024-07-24 PROCEDURE — G8417 CALC BMI ABV UP PARAM F/U: HCPCS | Performed by: PHYSICIAN ASSISTANT

## 2024-07-24 PROCEDURE — 99214 OFFICE O/P EST MOD 30 MIN: CPT | Performed by: PHYSICIAN ASSISTANT

## 2024-07-24 PROCEDURE — 4004F PT TOBACCO SCREEN RCVD TLK: CPT | Performed by: PHYSICIAN ASSISTANT

## 2024-07-24 ASSESSMENT — ENCOUNTER SYMPTOMS
NAUSEA: 0
ABDOMINAL PAIN: 0
SHORTNESS OF BREATH: 0
BACK PAIN: 0
COUGH: 0
WHEEZING: 0
COLOR CHANGE: 0
APNEA: 0
CONSTIPATION: 0
EYE REDNESS: 0
VOMITING: 0

## 2024-07-24 NOTE — PATIENT INSTRUCTIONS
STONE PREVENTION    To prevent future stone formation:    1) DO drink ~65-80 oz (2-2.5L) of water per day to stay adequately hydrated     2) AVOID/LIMIT intake of \"bad fluids\": soda/pop, coffee, tea, alcohol, energy drinks, any beverage with caffeine can act to dehydrate you       \"BAD FLUIDS\" DO NOT COUNT TOWARD THE 65-80oz of water    3) REDUCE consumption of sodium/salt - DO NOT salt your food, read labels (lunch meats, canned soups, prepared meals are high in salt), choose low salt options    4) DO NOT EAT animal protein/meat more than 2 meals a day - this includes red meat, pork, poultry and fish    SURVEY:    You may be receiving a survey from [employer] regarding your visit today.    Please complete the survey to enable us to provide the highest quality of care to you and your family.    If you cannot score us a very good on any question, please call the office to discuss how we could have made your experience a very good one.    Thank you.

## 2024-07-24 NOTE — PROGRESS NOTES
HPI:    Patient is a 49 y.o. female in no acute distress.  She is alert and oriented to person, place, and time.      History  8/5/2022 cystoscopy left stent placement    8/23/2022  left HLL with stent exchange    Today:  Patient is here today for follow-up kidney stones.  Patient was last seen 2/2023.  She states that occasionally she has back pain but is she is uncertain whether or not it is kidney stone related.  She denies any current abdominal or flank pain.  She denies any hematuria.  She denies any fever or chills.  Patient did have a KUB prior to visit today which is independently reviewed showing probable 4-5 mm right renal calculi.  Possible punctate left renal calculi. 12 oz coffee a day. Starry, water and flavored water.     Past Medical History:   Diagnosis Date    Hyperlipidemia     Hypertension      Past Surgical History:   Procedure Laterality Date    BLADDER SURGERY      CYSTOSCOPY Left 08/05/2022    Dr. Sue - with stent placement    CYSTOSCOPY Left 8/5/2022    CYSTOSCOPY URETERAL STENT INSERTION performed by Rayray Sue MD at [facility] OR    CYSTOSCOPY Left 8/23/2022    CYSTOSCOPY URETEROSCOPY LASER-HLL performed by Rayray Sue MD at [facility] OR    CYSTOSCOPY Left 8/23/2022    CYSTOSCOPY URETERAL STENT INSERTION-EXCHANGE performed by Rayray Sue MD at [facility] OR    CYSTOSCOPY W/ URETERAL STENT PLACEMENT Left 08/05/2022    with Dr. Sue     Outpatient Encounter Medications as of 7/24/2024   Medication Sig Dispense Refill    albuterol sulfate HFA (VENTOLIN HFA) 108 (90 Base) MCG/ACT inhaler Inhale 2 puffs into the lungs 4 times daily as needed for Wheezing 18 g 0    albuterol (PROVENTIL) (5 MG/ML) 0.5% nebulizer solution Take 0.5 mLs by nebulization every 6 hours as needed for Wheezing 120 each 0    VENTOLIN  (90 Base) MCG/ACT inhaler inhale 2 puffs by mouth and INTO THE LUNGS every 6 hours if needed for wheezing      fluticasone (FLONASE) 50 MCG/ACT nasal spray instill 2

## 2024-11-07 ENCOUNTER — HOSPITAL ENCOUNTER (EMERGENCY)
Age: 49
Discharge: HOME | End: 2024-11-07
Attending: STUDENT IN AN ORGANIZED HEALTH CARE EDUCATION/TRAINING PROGRAM
Payer: COMMERCIAL

## 2024-11-07 VITALS
SYSTOLIC BLOOD PRESSURE: 163 MMHG | RESPIRATION RATE: 18 BRPM | HEART RATE: 80 BPM | TEMPERATURE: 98.2 F | DIASTOLIC BLOOD PRESSURE: 90 MMHG | OXYGEN SATURATION: 97 %

## 2024-11-07 DIAGNOSIS — N39.0 ACUTE UTI: Primary | ICD-10-CM

## 2024-11-07 LAB
BACTERIA URNS QL MICRO: ABNORMAL
BILIRUB UR QL STRIP: NEGATIVE
CLARITY UR: ABNORMAL
COLOR UR: YELLOW
EPI CELLS #/AREA URNS HPF: ABNORMAL /HPF (ref 0–25)
GLUCOSE UR STRIP-MCNC: NEGATIVE MG/DL
HGB UR QL STRIP.AUTO: ABNORMAL
KETONES UR STRIP-MCNC: NEGATIVE MG/DL
LEUKOCYTE ESTERASE UR QL STRIP: ABNORMAL
NITRITE UR QL STRIP: POSITIVE
PH UR STRIP: 6 [PH] (ref 5–9)
PROT UR STRIP-MCNC: ABNORMAL MG/DL
RBC #/AREA URNS HPF: ABNORMAL /HPF (ref 0–2)
SP GR UR STRIP: 1.01 (ref 1.01–1.02)
UROBILINOGEN UR STRIP-ACNC: NORMAL EU/DL (ref 0–1)
WBC #/AREA URNS HPF: ABNORMAL /HPF (ref 0–5)

## 2024-11-07 PROCEDURE — 6370000000 HC RX 637 (ALT 250 FOR IP): Performed by: STUDENT IN AN ORGANIZED HEALTH CARE EDUCATION/TRAINING PROGRAM

## 2024-11-07 PROCEDURE — 87186 SC STD MICRODIL/AGAR DIL: CPT

## 2024-11-07 PROCEDURE — 81001 URINALYSIS AUTO W/SCOPE: CPT

## 2024-11-07 PROCEDURE — 87086 URINE CULTURE/COLONY COUNT: CPT

## 2024-11-07 PROCEDURE — 99283 EMERGENCY DEPT VISIT LOW MDM: CPT

## 2024-11-07 PROCEDURE — 87088 URINE BACTERIA CULTURE: CPT

## 2024-11-07 RX ORDER — CEPHALEXIN 500 MG/1
500 CAPSULE ORAL ONCE
Status: COMPLETED | OUTPATIENT
Start: 2024-11-07 | End: 2024-11-07

## 2024-11-07 RX ORDER — PHENAZOPYRIDINE HYDROCHLORIDE 200 MG/1
200 TABLET, FILM COATED ORAL 3 TIMES DAILY PRN
Qty: 6 TABLET | Refills: 0 | Status: SHIPPED | OUTPATIENT
Start: 2024-11-07 | End: 2024-11-10

## 2024-11-07 RX ORDER — PHENAZOPYRIDINE HYDROCHLORIDE 100 MG/1
200 TABLET, FILM COATED ORAL ONCE
Status: COMPLETED | OUTPATIENT
Start: 2024-11-07 | End: 2024-11-07

## 2024-11-07 RX ORDER — CEPHALEXIN 500 MG/1
500 CAPSULE ORAL 4 TIMES DAILY
Qty: 28 CAPSULE | Refills: 0 | Status: SHIPPED | OUTPATIENT
Start: 2024-11-07 | End: 2024-11-14

## 2024-11-07 RX ADMIN — CEPHALEXIN 500 MG: 500 CAPSULE ORAL at 22:58

## 2024-11-07 RX ADMIN — PHENAZOPYRIDINE 200 MG: 100 TABLET ORAL at 22:33

## 2024-11-07 ASSESSMENT — LIFESTYLE VARIABLES
HOW MANY STANDARD DRINKS CONTAINING ALCOHOL DO YOU HAVE ON A TYPICAL DAY: PATIENT DOES NOT DRINK
HOW OFTEN DO YOU HAVE A DRINK CONTAINING ALCOHOL: NEVER

## 2024-11-07 NOTE — DISCHARGE INSTRUCTIONS
Stop taking Augmentin and begin taking doxycycline.  I do not recommend taking Augmentin in the future.  Begin the newly prescribed steroid taper.  Use albuterol as needed for shortness of breath and wheezing.   Follow up with oncology within 1 week of discharge,   Phone: (   )    -  Fax: (   )    -  Follow Up Time:

## 2024-11-08 ASSESSMENT — ENCOUNTER SYMPTOMS
SHORTNESS OF BREATH: 0
NAUSEA: 0
VOMITING: 0
ABDOMINAL PAIN: 0
COUGH: 0

## 2024-11-08 NOTE — DISCHARGE INSTRUCTIONS
Please take antibiotics as prescribed all the way through until they are finished and follow-up with your primary care provider.  If your symptoms worsen or you develop high fevers and flank pain, please return to the emergency department.

## 2024-11-08 NOTE — ED PROVIDER NOTES
[facility] ED  EMERGENCY DEPARTMENT ENCOUNTER      Pt Name: Charu Ugarte  MRN: [id number]  Birthdate 1975  Date of evaluation: 11/7/2024  Provider: Drew Grimm DO    CHIEF COMPLAINT       Chief Complaint   Patient presents with    Hematuria     Pt. Reports pink tinged noted on toilet paper when wiping. Pt. Reports 2 episodes of bladder spasms earlier in the day         HISTORY OF PRESENT ILLNESS   (Location/Symptom, Timing/Onset, Context/Setting, Quality, Duration, Modifying Factors, Severity)  Note limiting factors.   Charu Ugarte is a 49 y.o. female who presents to the emergency department with dysuria and hematuria.  Patient states that today she noticed some discomfort at the end of urination as well as some pink-tinged urine.  Denies any fevers, chills, sweats, nausea, vomiting, flank pain, abdominal pain, or any other symptoms at this time.    HPI    Nursing Notes were reviewed.    REVIEW OF SYSTEMS    (2-9 systems for level 4, 10 or more for level 5)     Review of Systems   Constitutional:  Negative for chills and fever.   HENT: Negative.     Respiratory:  Negative for cough and shortness of breath.    Cardiovascular:  Negative for chest pain and palpitations.   Gastrointestinal:  Negative for abdominal pain, nausea and vomiting.   Genitourinary:  Positive for dysuria and hematuria. Negative for flank pain.   Skin:  Negative for rash and wound.   Neurological:  Negative for weakness, numbness and headaches.       Except as noted above the remainder of the review of systems was reviewed and negative.       PAST MEDICAL HISTORY     Past Medical History:   Diagnosis Date    Hyperlipidemia     Hypertension          SURGICAL HISTORY       Past Surgical History:   Procedure Laterality Date    BLADDER SURGERY      CYSTOSCOPY Left 08/05/2022    Dr. Sue - with stent placement    CYSTOSCOPY Left 8/5/2022    CYSTOSCOPY URETERAL STENT INSERTION performed by Rayray Sue MD at [facility] OR

## 2024-11-10 LAB
MICROORGANISM SPEC CULT: ABNORMAL
SPECIMEN DESCRIPTION: ABNORMAL

## 2025-07-21 ENCOUNTER — HOSPITAL ENCOUNTER (OUTPATIENT)
Dept: GENERAL RADIOLOGY | Age: 50
Discharge: HOME | End: 2025-07-23
Payer: COMMERCIAL

## 2025-07-21 DIAGNOSIS — N20.0 KIDNEY STONES: ICD-10-CM

## 2025-07-21 PROCEDURE — 74018 RADEX ABDOMEN 1 VIEW: CPT

## 2025-07-24 ENCOUNTER — HOSPITAL ENCOUNTER (OUTPATIENT)
Age: 50
Setting detail: SPECIMEN
Discharge: HOME | End: 2025-07-24
Payer: COMMERCIAL

## 2025-07-24 ENCOUNTER — OFFICE VISIT (OUTPATIENT)
Dept: UROLOGY | Age: 50
End: 2025-07-24

## 2025-07-24 VITALS
DIASTOLIC BLOOD PRESSURE: 68 MMHG | HEIGHT: 67 IN | BODY MASS INDEX: 43 KG/M2 | WEIGHT: 274 LBS | SYSTOLIC BLOOD PRESSURE: 120 MMHG | TEMPERATURE: 97.7 F

## 2025-07-24 DIAGNOSIS — R39.15 URGENCY OF URINATION: ICD-10-CM

## 2025-07-24 DIAGNOSIS — R31.29 MICROSCOPIC HEMATURIA: ICD-10-CM

## 2025-07-24 DIAGNOSIS — N39.0 FREQUENT UTI: Primary | ICD-10-CM

## 2025-07-24 DIAGNOSIS — N39.0 FREQUENT UTI: ICD-10-CM

## 2025-07-24 DIAGNOSIS — R35.0 FREQUENCY OF URINATION: ICD-10-CM

## 2025-07-24 LAB
AMORPH SED URNS QL MICRO: ABNORMAL
BACTERIA URNS QL MICRO: ABNORMAL
BILIRUB UR QL STRIP: NEGATIVE
CLARITY UR: CLEAR
COLOR UR: YELLOW
EPI CELLS #/AREA URNS HPF: ABNORMAL /HPF (ref 0–25)
GLUCOSE UR STRIP-MCNC: NEGATIVE MG/DL
HGB UR QL STRIP.AUTO: NEGATIVE
KETONES UR STRIP-MCNC: NEGATIVE MG/DL
LEUKOCYTE ESTERASE UR QL STRIP: ABNORMAL
MUCOUS THREADS URNS QL MICRO: ABNORMAL
NITRITE UR QL STRIP: NEGATIVE
PH UR STRIP: 6 [PH] (ref 5–9)
PROT UR STRIP-MCNC: NEGATIVE MG/DL
RBC #/AREA URNS HPF: ABNORMAL /HPF (ref 0–2)
SP GR UR STRIP: >1.03 (ref 1.01–1.02)
UROBILINOGEN UR STRIP-ACNC: NORMAL EU/DL (ref 0–1)
WBC #/AREA URNS HPF: ABNORMAL /HPF (ref 0–5)

## 2025-07-24 PROCEDURE — 87086 URINE CULTURE/COLONY COUNT: CPT

## 2025-07-24 PROCEDURE — 81001 URINALYSIS AUTO W/SCOPE: CPT

## 2025-07-24 RX ORDER — ESTRADIOL 0.1 MG/G
CREAM VAGINAL
Qty: 42.5 G | Refills: 3 | Status: SHIPPED | OUTPATIENT
Start: 2025-07-24

## 2025-07-24 RX ORDER — ATORVASTATIN CALCIUM 20 MG/1
20 TABLET, FILM COATED ORAL DAILY
COMMUNITY
Start: 2025-07-16

## 2025-07-24 NOTE — PROGRESS NOTES
History  8/5/2022 cystoscopy left stent placement    8/23/2022  left HLL with stent exchange   stone analysis showed calcium oxalate    Today  Patient is here today for one year follow-up kidney stones. She was seen in the ER 11/2024 with positive UA and culture for e-coli and >100 RBC per HPF.  She states that she was also treated for a urinary tract infection 3 weeks ago.  She currently is complaining of urgency, frequency and feelings of incomplete bladder emptying.  She has nocturia x 3. Denies dysuria, hematuria, flank pain or fevers. BMs daily. She did have a KUB completed prior today's visit and was independently reviewed, showing possible right sided kidney stone.  She is admittedly a poor water drinker.      Plan  Insert one inch of cream inside the vagina and place an additional dime size amount to the urethra and inner labia 3 nights per week thereafter. Do NOT use plastic applicator.     Due to stone on KUB and frequent urinary tract infections as well as significant microscopic hematuria we will proceed with a CT urogram    She will return to the office for lower tract visualization with cystoscopy and we will review CT results at that visit

## 2025-07-25 ENCOUNTER — TELEPHONE (OUTPATIENT)
Dept: UROLOGY | Age: 50
End: 2025-07-25

## 2025-07-25 LAB
MICROORGANISM SPEC CULT: NORMAL
SERVICE CMNT-IMP: NORMAL
SPECIMEN DESCRIPTION: NORMAL

## 2025-07-25 NOTE — TELEPHONE ENCOUNTER
Voicemail on office phone to clarify the estradiol. Instructions say Insert one inch of cream inside the vagina and place an additional dime size amount to the urethra and inner labia 3 nights per week thereafter . They are unsure what is thereafter. They said we can resend script or call. Thank you

## 2025-07-30 ENCOUNTER — HOSPITAL ENCOUNTER (OUTPATIENT)
Dept: CT IMAGING | Age: 50
Discharge: HOME | End: 2025-08-01
Payer: COMMERCIAL

## 2025-07-30 DIAGNOSIS — N39.0 FREQUENT UTI: ICD-10-CM

## 2025-07-30 DIAGNOSIS — R31.29 MICROSCOPIC HEMATURIA: ICD-10-CM

## 2025-07-30 LAB
BUN BLDV-MCNC: 15 MG/DL (ref 6–20)
CREAT SERPL-MCNC: 1 MG/DL (ref 0.51–1.15)
EGFR IF NONAFRICAN AMERICAN: 68 ML/MIN/1.73M2

## 2025-07-30 PROCEDURE — 74178 CT ABD&PLV WO CNTR FLWD CNTR: CPT | Performed by: NURSE PRACTITIONER

## 2025-07-30 PROCEDURE — 6360000004 HC RX CONTRAST MEDICATION: Performed by: NURSE PRACTITIONER

## 2025-07-30 RX ORDER — IOPAMIDOL 755 MG/ML
120 INJECTION, SOLUTION INTRAVASCULAR
Status: COMPLETED | OUTPATIENT
Start: 2025-07-30 | End: 2025-07-30

## 2025-07-30 RX ADMIN — IOPAMIDOL 120 ML: 755 INJECTION, SOLUTION INTRAVENOUS at 08:53

## (undated) DEVICE — NITINOL STONE RETRIEVAL BASKET: Brand: ZERO TIP

## (undated) DEVICE — SOLUTION IV IRRIG WATER 1000ML POUR BRL 2F7114

## (undated) DEVICE — FIBER LASER EXCALIBUR HOLM

## (undated) DEVICE — GUIDEWIRE VASC STR 3 CM 0.035 INX150 CM STD NIT ZIPWIRE

## (undated) DEVICE — URETEROSCOPE FLX DIGITAL 3.1X650 MM 1.2 MM AXIS DISP

## (undated) DEVICE — SINGLE ACTION PUMPING SYSTEM

## (undated) DEVICE — SOLUTION IV 1000ML 0.9% SOD CHL FOR IRRIG PLAS CONT

## (undated) DEVICE — Device

## (undated) DEVICE — SOLUTION IRRIG 3000ML 0.9% SOD CHL USP UROMATIC PLAS CONT

## (undated) DEVICE — ADAPTER URO SCP UROLOK LL